# Patient Record
Sex: MALE | Race: WHITE | Employment: FULL TIME | URBAN - METROPOLITAN AREA
[De-identification: names, ages, dates, MRNs, and addresses within clinical notes are randomized per-mention and may not be internally consistent; named-entity substitution may affect disease eponyms.]

---

## 2019-11-22 ENCOUNTER — APPOINTMENT (OUTPATIENT)
Dept: GENERAL RADIOLOGY | Age: 69
DRG: 281 | End: 2019-11-22
Attending: EMERGENCY MEDICINE
Payer: COMMERCIAL

## 2019-11-22 ENCOUNTER — HOSPITAL ENCOUNTER (INPATIENT)
Age: 69
LOS: 2 days | Discharge: HOME | DRG: 281 | End: 2019-11-24
Attending: EMERGENCY MEDICINE | Admitting: INTERNAL MEDICINE
Payer: COMMERCIAL

## 2019-11-22 DIAGNOSIS — I10 HYPERTENSION, UNSPECIFIED TYPE: ICD-10-CM

## 2019-11-22 DIAGNOSIS — I21.09 ST ELEVATION MYOCARDIAL INFARCTION (STEMI) OF ANTERIOR WALL (HCC): Primary | ICD-10-CM

## 2019-11-22 PROBLEM — I21.3 STEMI (ST ELEVATION MYOCARDIAL INFARCTION) (HCC): Status: ACTIVE | Noted: 2019-11-22

## 2019-11-22 LAB
ACT BLD: 153 SECS (ref 70–128)
ACT BLD: 213 SECS (ref 70–128)
ALBUMIN SERPL-MCNC: 3.5 G/DL (ref 3.2–4.6)
ALBUMIN/GLOB SERPL: 0.8 {RATIO} (ref 1.2–3.5)
ALP SERPL-CCNC: 167 U/L (ref 50–136)
ALT SERPL-CCNC: 36 U/L (ref 12–65)
ANION GAP SERPL CALC-SCNC: 10 MMOL/L (ref 7–16)
AST SERPL-CCNC: 37 U/L (ref 15–37)
BASOPHILS # BLD: 0.1 K/UL (ref 0–0.2)
BASOPHILS NFR BLD: 1 % (ref 0–2)
BILIRUB SERPL-MCNC: 0.4 MG/DL (ref 0.2–1.1)
BUN SERPL-MCNC: 27 MG/DL (ref 8–23)
CALCIUM SERPL-MCNC: 8.8 MG/DL (ref 8.3–10.4)
CHLORIDE SERPL-SCNC: 110 MMOL/L (ref 98–107)
CO2 SERPL-SCNC: 23 MMOL/L (ref 21–32)
CREAT SERPL-MCNC: 0.9 MG/DL (ref 0.8–1.5)
DIFFERENTIAL METHOD BLD: ABNORMAL
EOSINOPHIL # BLD: 0.3 K/UL (ref 0–0.8)
EOSINOPHIL NFR BLD: 3 % (ref 0.5–7.8)
ERYTHROCYTE [DISTWIDTH] IN BLOOD BY AUTOMATED COUNT: 13.1 % (ref 11.9–14.6)
GLOBULIN SER CALC-MCNC: 4.3 G/DL (ref 2.3–3.5)
GLUCOSE SERPL-MCNC: 102 MG/DL (ref 65–100)
HCT VFR BLD AUTO: 43.7 % (ref 41.1–50.3)
HGB BLD-MCNC: 14.6 G/DL (ref 13.6–17.2)
IMM GRANULOCYTES # BLD AUTO: 0.1 K/UL (ref 0–0.5)
IMM GRANULOCYTES NFR BLD AUTO: 1 % (ref 0–5)
LYMPHOCYTES # BLD: 1.1 K/UL (ref 0.5–4.6)
LYMPHOCYTES NFR BLD: 12 % (ref 13–44)
MCH RBC QN AUTO: 32 PG (ref 26.1–32.9)
MCHC RBC AUTO-ENTMCNC: 33.4 G/DL (ref 31.4–35)
MCV RBC AUTO: 95.8 FL (ref 79.6–97.8)
MONOCYTES # BLD: 0.9 K/UL (ref 0.1–1.3)
MONOCYTES NFR BLD: 10 % (ref 4–12)
NEUTS SEG # BLD: 6.8 K/UL (ref 1.7–8.2)
NEUTS SEG NFR BLD: 74 % (ref 43–78)
NRBC # BLD: 0 K/UL (ref 0–0.2)
PLATELET # BLD AUTO: 233 K/UL (ref 150–450)
PMV BLD AUTO: 10.2 FL (ref 9.4–12.3)
POTASSIUM SERPL-SCNC: 3.9 MMOL/L (ref 3.5–5.1)
PROT SERPL-MCNC: 7.8 G/DL (ref 6.3–8.2)
RBC # BLD AUTO: 4.56 M/UL (ref 4.23–5.6)
SODIUM SERPL-SCNC: 143 MMOL/L (ref 136–145)
TROPONIN I BLD-MCNC: 0.04 NG/ML (ref 0.02–0.05)
TROPONIN I SERPL-MCNC: <0.02 NG/ML (ref 0.02–0.05)
WBC # BLD AUTO: 9.3 K/UL (ref 4.3–11.1)

## 2019-11-22 PROCEDURE — 77030004559 HC CATH ANGI DX SUPT CARD -B

## 2019-11-22 PROCEDURE — 85025 COMPLETE CBC W/AUTO DIFF WBC: CPT

## 2019-11-22 PROCEDURE — 74011250636 HC RX REV CODE- 250/636: Performed by: NURSE PRACTITIONER

## 2019-11-22 PROCEDURE — 99285 EMERGENCY DEPT VISIT HI MDM: CPT | Performed by: EMERGENCY MEDICINE

## 2019-11-22 PROCEDURE — 65620000000 HC RM CCU GENERAL

## 2019-11-22 PROCEDURE — B2111ZZ FLUOROSCOPY OF MULTIPLE CORONARY ARTERIES USING LOW OSMOLAR CONTRAST: ICD-10-PCS | Performed by: INTERNAL MEDICINE

## 2019-11-22 PROCEDURE — 74011250637 HC RX REV CODE- 250/637: Performed by: INTERNAL MEDICINE

## 2019-11-22 PROCEDURE — 77030012468 HC VLV BLEEDBK CNTRL ABBT -B

## 2019-11-22 PROCEDURE — 99152 MOD SED SAME PHYS/QHP 5/>YRS: CPT

## 2019-11-22 PROCEDURE — C1894 INTRO/SHEATH, NON-LASER: HCPCS

## 2019-11-22 PROCEDURE — 74011250636 HC RX REV CODE- 250/636: Performed by: INTERNAL MEDICINE

## 2019-11-22 PROCEDURE — C1769 GUIDE WIRE: HCPCS

## 2019-11-22 PROCEDURE — 84484 ASSAY OF TROPONIN QUANT: CPT

## 2019-11-22 PROCEDURE — 93005 ELECTROCARDIOGRAM TRACING: CPT | Performed by: EMERGENCY MEDICINE

## 2019-11-22 PROCEDURE — 80053 COMPREHEN METABOLIC PANEL: CPT

## 2019-11-22 PROCEDURE — B2151ZZ FLUOROSCOPY OF LEFT HEART USING LOW OSMOLAR CONTRAST: ICD-10-PCS | Performed by: INTERNAL MEDICINE

## 2019-11-22 PROCEDURE — 74011250637 HC RX REV CODE- 250/637: Performed by: EMERGENCY MEDICINE

## 2019-11-22 PROCEDURE — 77030029997 HC DEV COM RDL R BND TELE -B

## 2019-11-22 PROCEDURE — 99153 MOD SED SAME PHYS/QHP EA: CPT

## 2019-11-22 PROCEDURE — 96374 THER/PROPH/DIAG INJ IV PUSH: CPT | Performed by: EMERGENCY MEDICINE

## 2019-11-22 PROCEDURE — C1887 CATHETER, GUIDING: HCPCS

## 2019-11-22 PROCEDURE — C1760 CLOSURE DEV, VASC: HCPCS

## 2019-11-22 PROCEDURE — 85347 COAGULATION TIME ACTIVATED: CPT

## 2019-11-22 PROCEDURE — 96375 TX/PRO/DX INJ NEW DRUG ADDON: CPT | Performed by: EMERGENCY MEDICINE

## 2019-11-22 PROCEDURE — 74011250636 HC RX REV CODE- 250/636: Performed by: EMERGENCY MEDICINE

## 2019-11-22 PROCEDURE — 93458 L HRT ARTERY/VENTRICLE ANGIO: CPT

## 2019-11-22 PROCEDURE — 74011636320 HC RX REV CODE- 636/320: Performed by: INTERNAL MEDICINE

## 2019-11-22 PROCEDURE — 77030034850

## 2019-11-22 PROCEDURE — 74011000250 HC RX REV CODE- 250: Performed by: NURSE PRACTITIONER

## 2019-11-22 PROCEDURE — 74011250636 HC RX REV CODE- 250/636

## 2019-11-22 PROCEDURE — 77030004534 HC CATH ANGI DX INFN CARD -A

## 2019-11-22 PROCEDURE — 71045 X-RAY EXAM CHEST 1 VIEW: CPT

## 2019-11-22 PROCEDURE — 4A023N7 MEASUREMENT OF CARDIAC SAMPLING AND PRESSURE, LEFT HEART, PERCUTANEOUS APPROACH: ICD-10-PCS | Performed by: INTERNAL MEDICINE

## 2019-11-22 PROCEDURE — 74011250637 HC RX REV CODE- 250/637: Performed by: NURSE PRACTITIONER

## 2019-11-22 RX ORDER — SODIUM CHLORIDE 0.9 % (FLUSH) 0.9 %
5-40 SYRINGE (ML) INJECTION EVERY 8 HOURS
Status: DISCONTINUED | OUTPATIENT
Start: 2019-11-22 | End: 2019-11-24 | Stop reason: HOSPADM

## 2019-11-22 RX ORDER — ONDANSETRON 2 MG/ML
4 INJECTION INTRAMUSCULAR; INTRAVENOUS
Status: DISCONTINUED | OUTPATIENT
Start: 2019-11-22 | End: 2019-11-24 | Stop reason: HOSPADM

## 2019-11-22 RX ORDER — ATORVASTATIN CALCIUM 40 MG/1
80 TABLET, FILM COATED ORAL DAILY
Status: DISCONTINUED | OUTPATIENT
Start: 2019-11-22 | End: 2019-11-24 | Stop reason: HOSPADM

## 2019-11-22 RX ORDER — LOSARTAN POTASSIUM 25 MG/1
25 TABLET ORAL DAILY
Status: DISCONTINUED | OUTPATIENT
Start: 2019-11-23 | End: 2019-11-24 | Stop reason: HOSPADM

## 2019-11-22 RX ORDER — METOPROLOL TARTRATE 25 MG/1
25 TABLET, FILM COATED ORAL 2 TIMES DAILY
Status: DISCONTINUED | OUTPATIENT
Start: 2019-11-23 | End: 2019-11-24 | Stop reason: HOSPADM

## 2019-11-22 RX ORDER — SODIUM CHLORIDE 9 MG/ML
75 INJECTION, SOLUTION INTRAVENOUS CONTINUOUS
Status: DISPENSED | OUTPATIENT
Start: 2019-11-22 | End: 2019-11-23

## 2019-11-22 RX ORDER — SODIUM CHLORIDE 0.9 % (FLUSH) 0.9 %
5-40 SYRINGE (ML) INJECTION AS NEEDED
Status: DISCONTINUED | OUTPATIENT
Start: 2019-11-22 | End: 2019-11-22

## 2019-11-22 RX ORDER — HEPARIN SODIUM 200 [USP'U]/100ML
2 INJECTION, SOLUTION INTRAVENOUS CONTINUOUS
Status: DISCONTINUED | OUTPATIENT
Start: 2019-11-22 | End: 2019-11-22

## 2019-11-22 RX ORDER — SODIUM CHLORIDE 0.9 % (FLUSH) 0.9 %
5-40 SYRINGE (ML) INJECTION AS NEEDED
Status: DISCONTINUED | OUTPATIENT
Start: 2019-11-22 | End: 2019-11-24 | Stop reason: HOSPADM

## 2019-11-22 RX ORDER — METOPROLOL TARTRATE 5 MG/5ML
2.5 INJECTION INTRAVENOUS
Status: DISCONTINUED | OUTPATIENT
Start: 2019-11-22 | End: 2019-11-22

## 2019-11-22 RX ORDER — CLOPIDOGREL BISULFATE 75 MG/1
600 TABLET ORAL ONCE
Status: COMPLETED | OUTPATIENT
Start: 2019-11-22 | End: 2019-11-22

## 2019-11-22 RX ORDER — CLOPIDOGREL BISULFATE 75 MG/1
75 TABLET ORAL DAILY
Status: DISCONTINUED | OUTPATIENT
Start: 2019-11-23 | End: 2019-11-24 | Stop reason: HOSPADM

## 2019-11-22 RX ORDER — METOPROLOL TARTRATE 5 MG/5ML
2.5 INJECTION INTRAVENOUS ONCE
Status: COMPLETED | OUTPATIENT
Start: 2019-11-22 | End: 2019-11-22

## 2019-11-22 RX ORDER — LORAZEPAM 2 MG/ML
0.5 INJECTION INTRAMUSCULAR
Status: DISCONTINUED | OUTPATIENT
Start: 2019-11-22 | End: 2019-11-24 | Stop reason: HOSPADM

## 2019-11-22 RX ORDER — GUAIFENESIN 100 MG/5ML
324 LIQUID (ML) ORAL
Status: COMPLETED | OUTPATIENT
Start: 2019-11-22 | End: 2019-11-22

## 2019-11-22 RX ORDER — MIDAZOLAM HYDROCHLORIDE 1 MG/ML
.5-2 INJECTION, SOLUTION INTRAMUSCULAR; INTRAVENOUS
Status: DISCONTINUED | OUTPATIENT
Start: 2019-11-22 | End: 2019-11-22

## 2019-11-22 RX ORDER — HEPARIN SODIUM 5000 [USP'U]/ML
5000 INJECTION, SOLUTION INTRAVENOUS; SUBCUTANEOUS
Status: COMPLETED | OUTPATIENT
Start: 2019-11-22 | End: 2019-11-22

## 2019-11-22 RX ORDER — FAMOTIDINE 10 MG/ML
INJECTION INTRAVENOUS
Status: COMPLETED
Start: 2019-11-22 | End: 2019-11-22

## 2019-11-22 RX ORDER — LOSARTAN POTASSIUM 25 MG/1
25 TABLET ORAL DAILY
COMMUNITY

## 2019-11-22 RX ORDER — FAMOTIDINE 20 MG/1
20 TABLET, FILM COATED ORAL 2 TIMES DAILY
Status: DISCONTINUED | OUTPATIENT
Start: 2019-11-23 | End: 2019-11-24 | Stop reason: HOSPADM

## 2019-11-22 RX ORDER — SODIUM CHLORIDE 0.9 % (FLUSH) 0.9 %
5-40 SYRINGE (ML) INJECTION EVERY 8 HOURS
Status: DISCONTINUED | OUTPATIENT
Start: 2019-11-22 | End: 2019-11-22

## 2019-11-22 RX ORDER — DIPHENHYDRAMINE HCL 25 MG
25 CAPSULE ORAL
Status: DISCONTINUED | OUTPATIENT
Start: 2019-11-22 | End: 2019-11-24 | Stop reason: HOSPADM

## 2019-11-22 RX ORDER — MAG HYDROX/ALUMINUM HYD/SIMETH 200-200-20
30 SUSPENSION, ORAL (FINAL DOSE FORM) ORAL
Status: DISCONTINUED | OUTPATIENT
Start: 2019-11-22 | End: 2019-11-24 | Stop reason: HOSPADM

## 2019-11-22 RX ORDER — CALCIUM CARBONATE 200(500)MG
200 TABLET,CHEWABLE ORAL
Status: DISCONTINUED | OUTPATIENT
Start: 2019-11-22 | End: 2019-11-24 | Stop reason: HOSPADM

## 2019-11-22 RX ORDER — MORPHINE SULFATE 2 MG/ML
2 INJECTION, SOLUTION INTRAMUSCULAR; INTRAVENOUS
Status: DISCONTINUED | OUTPATIENT
Start: 2019-11-22 | End: 2019-11-24 | Stop reason: HOSPADM

## 2019-11-22 RX ORDER — ACETAMINOPHEN 325 MG/1
650 TABLET ORAL
Status: DISCONTINUED | OUTPATIENT
Start: 2019-11-22 | End: 2019-11-24 | Stop reason: HOSPADM

## 2019-11-22 RX ORDER — NITROGLYCERIN 0.4 MG/1
0.4 TABLET SUBLINGUAL
Status: DISCONTINUED | OUTPATIENT
Start: 2019-11-22 | End: 2019-11-24 | Stop reason: HOSPADM

## 2019-11-22 RX ORDER — GUAIFENESIN 100 MG/5ML
81 LIQUID (ML) ORAL DAILY
Status: DISCONTINUED | OUTPATIENT
Start: 2019-11-23 | End: 2019-11-24 | Stop reason: HOSPADM

## 2019-11-22 RX ORDER — FENTANYL CITRATE 50 UG/ML
25-50 INJECTION, SOLUTION INTRAMUSCULAR; INTRAVENOUS
Status: DISCONTINUED | OUTPATIENT
Start: 2019-11-22 | End: 2019-11-22

## 2019-11-22 RX ORDER — HYDROCODONE BITARTRATE AND ACETAMINOPHEN 5; 325 MG/1; MG/1
1 TABLET ORAL
Status: DISCONTINUED | OUTPATIENT
Start: 2019-11-22 | End: 2019-11-24 | Stop reason: HOSPADM

## 2019-11-22 RX ORDER — ONDANSETRON 2 MG/ML
4 INJECTION INTRAMUSCULAR; INTRAVENOUS
Status: COMPLETED | OUTPATIENT
Start: 2019-11-22 | End: 2019-11-22

## 2019-11-22 RX ORDER — HEPARIN SODIUM 10000 [USP'U]/ML
1000-10000 INJECTION, SOLUTION INTRAVENOUS; SUBCUTANEOUS
Status: DISCONTINUED | OUTPATIENT
Start: 2019-11-22 | End: 2019-11-22

## 2019-11-22 RX ADMIN — CLOPIDOGREL BISULFATE 600 MG: 75 TABLET ORAL at 22:01

## 2019-11-22 RX ADMIN — NITROGLYCERIN 0.4 MG: 0.4 TABLET SUBLINGUAL at 20:30

## 2019-11-22 RX ADMIN — Medication 5 ML: at 22:14

## 2019-11-22 RX ADMIN — MIDAZOLAM 1 MG: 1 INJECTION INTRAMUSCULAR; INTRAVENOUS at 21:01

## 2019-11-22 RX ADMIN — METOPROLOL TARTRATE 2.5 MG: 5 INJECTION INTRAVENOUS at 20:36

## 2019-11-22 RX ADMIN — SODIUM CHLORIDE 75 ML/HR: 900 INJECTION, SOLUTION INTRAVENOUS at 22:53

## 2019-11-22 RX ADMIN — ATORVASTATIN CALCIUM 80 MG: 40 TABLET, FILM COATED ORAL at 22:53

## 2019-11-22 RX ADMIN — HEPARIN SODIUM 2 UNITS/HR: 5000 INJECTION, SOLUTION INTRAVENOUS; SUBCUTANEOUS at 21:06

## 2019-11-22 RX ADMIN — HEPARIN SODIUM 2000 UNITS: 10000 INJECTION INTRAVENOUS; SUBCUTANEOUS at 21:43

## 2019-11-22 RX ADMIN — MIDAZOLAM 1 MG: 1 INJECTION INTRAMUSCULAR; INTRAVENOUS at 21:05

## 2019-11-22 RX ADMIN — HEPARIN SODIUM 5000 UNITS: 5000 INJECTION INTRAVENOUS; SUBCUTANEOUS at 20:34

## 2019-11-22 RX ADMIN — MIDAZOLAM 2 MG: 1 INJECTION INTRAMUSCULAR; INTRAVENOUS at 21:11

## 2019-11-22 RX ADMIN — HEPARIN SODIUM 5200 UNITS: 10000 INJECTION INTRAVENOUS; SUBCUTANEOUS at 21:26

## 2019-11-22 RX ADMIN — FAMOTIDINE 20 MG: 10 INJECTION, SOLUTION INTRAVENOUS at 20:41

## 2019-11-22 RX ADMIN — NITROGLYCERIN 0.4 MG: 0.4 TABLET SUBLINGUAL at 20:25

## 2019-11-22 RX ADMIN — FENTANYL CITRATE 25 MCG: 50 INJECTION, SOLUTION INTRAMUSCULAR; INTRAVENOUS at 21:43

## 2019-11-22 RX ADMIN — ASPIRIN 81 MG 324 MG: 81 TABLET ORAL at 20:31

## 2019-11-22 RX ADMIN — ONDANSETRON 4 MG: 2 INJECTION INTRAMUSCULAR; INTRAVENOUS at 20:43

## 2019-11-22 RX ADMIN — FENTANYL CITRATE 25 MCG: 50 INJECTION, SOLUTION INTRAMUSCULAR; INTRAVENOUS at 21:19

## 2019-11-22 RX ADMIN — MIDAZOLAM 1 MG: 1 INJECTION INTRAMUSCULAR; INTRAVENOUS at 21:19

## 2019-11-22 RX ADMIN — METOPROLOL TARTRATE 2.5 MG: 5 INJECTION INTRAVENOUS at 20:34

## 2019-11-22 RX ADMIN — FENTANYL CITRATE 25 MCG: 50 INJECTION, SOLUTION INTRAMUSCULAR; INTRAVENOUS at 21:01

## 2019-11-22 RX ADMIN — FENTANYL CITRATE 25 MCG: 50 INJECTION, SOLUTION INTRAMUSCULAR; INTRAVENOUS at 21:05

## 2019-11-22 RX ADMIN — IOPAMIDOL 240 ML: 755 INJECTION, SOLUTION INTRAVENOUS at 22:08

## 2019-11-22 RX ADMIN — Medication 5 ML: at 23:00

## 2019-11-22 RX ADMIN — MIDAZOLAM 1 MG: 1 INJECTION INTRAMUSCULAR; INTRAVENOUS at 21:43

## 2019-11-23 LAB
ANION GAP SERPL CALC-SCNC: 6 MMOL/L (ref 7–16)
ATRIAL RATE: 86 BPM
ATRIAL RATE: 91 BPM
BUN SERPL-MCNC: 20 MG/DL (ref 8–23)
CALCIUM SERPL-MCNC: 7.9 MG/DL (ref 8.3–10.4)
CALCULATED P AXIS, ECG09: -88 DEGREES
CALCULATED P AXIS, ECG09: 71 DEGREES
CALCULATED R AXIS, ECG10: 21 DEGREES
CALCULATED R AXIS, ECG10: 35 DEGREES
CALCULATED T AXIS, ECG11: 69 DEGREES
CALCULATED T AXIS, ECG11: 72 DEGREES
CHLORIDE SERPL-SCNC: 108 MMOL/L (ref 98–107)
CHOLEST SERPL-MCNC: 141 MG/DL
CO2 SERPL-SCNC: 26 MMOL/L (ref 21–32)
CREAT SERPL-MCNC: 0.68 MG/DL (ref 0.8–1.5)
DIAGNOSIS, 93000: NORMAL
DIAGNOSIS, 93000: NORMAL
ERYTHROCYTE [DISTWIDTH] IN BLOOD BY AUTOMATED COUNT: 12.9 % (ref 11.9–14.6)
EST. AVERAGE GLUCOSE BLD GHB EST-MCNC: 108 MG/DL
GLUCOSE SERPL-MCNC: 99 MG/DL (ref 65–100)
HBA1C MFR BLD: 5.4 % (ref 4.8–6)
HCT VFR BLD AUTO: 39 % (ref 41.1–50.3)
HDLC SERPL-MCNC: 58 MG/DL (ref 40–60)
HDLC SERPL: 2.4 {RATIO}
HGB BLD-MCNC: 12.9 G/DL (ref 13.6–17.2)
LDLC SERPL CALC-MCNC: 69 MG/DL
LIPID PROFILE,FLP: NORMAL
MAGNESIUM SERPL-MCNC: 2 MG/DL (ref 1.8–2.4)
MCH RBC QN AUTO: 32 PG (ref 26.1–32.9)
MCHC RBC AUTO-ENTMCNC: 33.1 G/DL (ref 31.4–35)
MCV RBC AUTO: 96.8 FL (ref 79.6–97.8)
NRBC # BLD: 0 K/UL (ref 0–0.2)
P-R INTERVAL, ECG05: 142 MS
P-R INTERVAL, ECG05: 174 MS
PLATELET # BLD AUTO: 190 K/UL (ref 150–450)
PMV BLD AUTO: 10.4 FL (ref 9.4–12.3)
POTASSIUM SERPL-SCNC: 3.7 MMOL/L (ref 3.5–5.1)
Q-T INTERVAL, ECG07: 372 MS
Q-T INTERVAL, ECG07: 378 MS
QRS DURATION, ECG06: 110 MS
QRS DURATION, ECG06: 88 MS
QTC CALCULATION (BEZET), ECG08: 445 MS
QTC CALCULATION (BEZET), ECG08: 464 MS
RBC # BLD AUTO: 4.03 M/UL (ref 4.23–5.6)
SODIUM SERPL-SCNC: 140 MMOL/L (ref 136–145)
TRIGL SERPL-MCNC: 70 MG/DL (ref 35–150)
TROPONIN I SERPL-MCNC: 8.65 NG/ML (ref 0.02–0.05)
TSH SERPL DL<=0.005 MIU/L-ACNC: 1.79 UIU/ML (ref 0.36–3.74)
VENTRICULAR RATE, ECG03: 86 BPM
VENTRICULAR RATE, ECG03: 91 BPM
VLDLC SERPL CALC-MCNC: 14 MG/DL (ref 6–23)
WBC # BLD AUTO: 7.4 K/UL (ref 4.3–11.1)

## 2019-11-23 PROCEDURE — 83735 ASSAY OF MAGNESIUM: CPT

## 2019-11-23 PROCEDURE — 74011250636 HC RX REV CODE- 250/636: Performed by: INTERNAL MEDICINE

## 2019-11-23 PROCEDURE — 36415 COLL VENOUS BLD VENIPUNCTURE: CPT

## 2019-11-23 PROCEDURE — 74011000250 HC RX REV CODE- 250: Performed by: INTERNAL MEDICINE

## 2019-11-23 PROCEDURE — 85027 COMPLETE CBC AUTOMATED: CPT

## 2019-11-23 PROCEDURE — 80048 BASIC METABOLIC PNL TOTAL CA: CPT

## 2019-11-23 PROCEDURE — 84443 ASSAY THYROID STIM HORMONE: CPT

## 2019-11-23 PROCEDURE — 65660000000 HC RM CCU STEPDOWN

## 2019-11-23 PROCEDURE — 74011250637 HC RX REV CODE- 250/637: Performed by: INTERNAL MEDICINE

## 2019-11-23 PROCEDURE — 83036 HEMOGLOBIN GLYCOSYLATED A1C: CPT

## 2019-11-23 PROCEDURE — 74011250637 HC RX REV CODE- 250/637: Performed by: NURSE PRACTITIONER

## 2019-11-23 PROCEDURE — 77030010547 HC BG URIN W/UMETER -A

## 2019-11-23 PROCEDURE — 80061 LIPID PANEL: CPT

## 2019-11-23 PROCEDURE — 84484 ASSAY OF TROPONIN QUANT: CPT

## 2019-11-23 PROCEDURE — C8929 TTE W OR WO FOL WCON,DOPPLER: HCPCS

## 2019-11-23 RX ORDER — LIDOCAINE HYDROCHLORIDE 20 MG/ML
JELLY TOPICAL ONCE
Status: DISPENSED | OUTPATIENT
Start: 2019-11-23 | End: 2019-11-23

## 2019-11-23 RX ADMIN — FAMOTIDINE 20 MG: 20 TABLET, FILM COATED ORAL at 17:05

## 2019-11-23 RX ADMIN — Medication 5 ML: at 14:20

## 2019-11-23 RX ADMIN — PERFLUTREN 1 ML: 6.52 INJECTION, SUSPENSION INTRAVENOUS at 10:00

## 2019-11-23 RX ADMIN — ACETAMINOPHEN 650 MG: 325 TABLET, FILM COATED ORAL at 19:46

## 2019-11-23 RX ADMIN — METOPROLOL TARTRATE 25 MG: 25 TABLET ORAL at 08:16

## 2019-11-23 RX ADMIN — ASPIRIN 81 MG 81 MG: 81 TABLET ORAL at 08:16

## 2019-11-23 RX ADMIN — Medication 5 ML: at 06:00

## 2019-11-23 RX ADMIN — LOSARTAN POTASSIUM 25 MG: 25 TABLET ORAL at 08:16

## 2019-11-23 RX ADMIN — ATORVASTATIN CALCIUM 80 MG: 40 TABLET, FILM COATED ORAL at 21:17

## 2019-11-23 RX ADMIN — FAMOTIDINE 20 MG: 20 TABLET, FILM COATED ORAL at 08:16

## 2019-11-23 RX ADMIN — DIPHENHYDRAMINE HYDROCHLORIDE 25 MG: 25 CAPSULE ORAL at 21:17

## 2019-11-23 RX ADMIN — POTASSIUM BICARBONATE 40 MEQ: 782 TABLET, EFFERVESCENT ORAL at 04:52

## 2019-11-23 RX ADMIN — METOPROLOL TARTRATE 25 MG: 25 TABLET ORAL at 17:05

## 2019-11-23 RX ADMIN — Medication 5 ML: at 21:17

## 2019-11-23 RX ADMIN — CLOPIDOGREL BISULFATE 75 MG: 75 TABLET ORAL at 08:16

## 2019-11-23 RX ADMIN — POTASSIUM BICARBONATE 40 MEQ: 782 TABLET, EFFERVESCENT ORAL at 08:16

## 2019-11-23 NOTE — PROGRESS NOTES
TRANSFER - IN REPORT:    Verbal report received from Dank, [address] on [place] Poster being received from CCU for routine progression of care      Report consisted of patients Situation, Background, Assessment and Recommendations(SBAR). Information from the following report(s) SBAR, Kardex and MAR was reviewed with the receiving nurse. Opportunity for questions and clarification was provided. Assessment completed upon patients arrival to unit and care assumed. Dual RN skin assessment completed reveals     11/23/19 4117   Dual Skin Pressure Injury Assessment   Dual Skin Pressure Injury Assessment WDL   Second Care Provider (Based on [address] [place] [address]) Lazaro Butler RN   Skin Integumentary   Skin Integumentary (WDL) X    Pressure  Injury Documentation No Pressure Injury Noted-Pressure Ulcer Prevention Initiated   Skin Color Appropriate for ethnicity   Skin Condition/Temp Warm;Dry   Skin Integrity Other (comment)  (R. groin and R. radial cath exit sites)   Turgor Non-tenting   Hair Growth Present   Varicosities Absent     Skin warm and dry. Patient with a right groin and right radial cath exit site. Both with dressing c/d/i and no noted hemotoma or bleeding. Skin over sacrum and other pressure points intact and no other skin issues noted.

## 2019-11-23 NOTE — PROGRESS NOTES
[facility] CARDIOLOGY PROGRESS NOTE    11/23/2019 10:46 AM    Admit Date: 11/22/2019    Admit Diagnosis: STEMI (ST elevation myocardial infarction) ([facility] [I21.3]      Subjective:   Stable overnight without angina, CHF, or palpitations. Vitals stable and controlled. No other complaints overnight. Tolerating meds well. Had 15 beats NSVT last night, asymptomatic. Objective:      Vitals:    11/23/19 0801 11/23/19 0815 11/23/19 0824 11/23/19 0836   BP: 150/75      Pulse: 61 64 (!) 58 (!) 59   Resp: 20 21 17   Temp:       SpO2: 97% 99% 97% 99%   Weight:       Height:           Physical Exam:  Neck- supple, no JVD  CV- regular rate and rhythm no MRG  Lung- clear bilaterally  Abd- soft, nontender, nondistended  Ext- no edema, right groin CDI  Skin- warm and dry    Data Review:   Recent Labs     11/23/19  0306 11/22/19 2021    143   K 3.7 3.9   MG 2.0  --    BUN 20 27*   CREA 0.68* 0.90   GLU 99 102*   WBC 7.4 9.3   HGB 12.9* 14.6   HCT 39.0* 43.7    233   CHOL 141  --    TRIGL 70  --    HDL 58  --        Assessment and Plan: Active Problems:    STEMI (ST elevation myocardial infarction) ([facility] (11/22/2019)- improved, resolved, no angina or CHF. Echo today. HTN (hypertension) (11/22/2019)- controlled, continue meds, titrate as tolerated    NSVT- asymptomatic, labs stable, replete K today, follow tele. To floor today  Echo today  BMP, CBC, MG IN AM  TELE   ? HOME TOMORROW.          Wil Rendon MD  [facility] Cardiology  Pager [phone]

## 2019-11-23 NOTE — PROCEDURES
[facility]  CARDIAC CATH    Name:  Lupe Rodriguez  MR#:  [id number]  :  1950  ACCOUNT #:  [de-identified]  DATE OF SERVICE:  2019      PREOPERATIVE DIAGNOSIS:  Acute inferior myocardial infarction. POSTOPERATIVE DIAGNOSIS:  Acute inferior myocardial infarction. PROCEDURES PERFORMED:  Left heart cath with coronary angiography and left ventriculogram, dottering of the distal posterior descending branch with a wire but no angioplasty or stenting performed due to the distal nature of disease. SURGEON:  Steph Russell MD    ASSISTANT:  None. ANESTHESIA:  The patient was sedated by Pema Sarabia with a total of 5 mg Versed and 100 mcg fentanyl and monitored from 08:58 p.m. to 09:43 p.m. TOTAL CONTRAST:  240 mL of Isovue. COMPLICATIONS:  None. SPECIMENS REMOVED:  None. ESTIMATED BLOOD LOSS:  Less than 10 mL. IMPLANTS:  None. PROCEDURE TECHNIQUE:  After informed consent was obtained, the patient was brought to the cath lab, prepped and draped in the usual fashion. Access was obtained in the right radial artery on four separate occasions in different locations but a micropuncture wire could not be advanced up the radial artery. We aborted and placed a TR band and quickly obtained access in the right femoral artery with a bounding pulse, however, the iliac turned out to be extremely tortuous making navigation somewhat difficult. We eventually placed a wire into the descending aorta and placed a 6-Romansh sheath, but due to the tortuosity of the iliac and distal aorta, there was poor backup support which made it impossible to engage the coronaries with standard catheters. We placed a long Terumo sheath which was again ineffective. We eventually placed a 65 cm Destination sheath which greatly enhanced deliverance of the catheters and angiography was performed.   We used an XB3.5 to image the left main and a JR-4 guiding catheter to image the right coronary. Heparin was used for anticoagulation during the procedure and to dotter the distal-most portion of the posterior descending branch which was acutely occluded. At the conclusion of the procedure, an Angio-Seal was deployed at the right arterial access site due to a small hematoma and a leak around the sheath by imaging. There was excellent hemostasis and no hematoma or ecchymosis was noted at the conclusion of the procedure. The patient was asymptomatic with flat ST segments and no angina. He will be transported to the CCU in stable condition. PRESSURE RESULTS:  Left ventricle 120/15, aorta 120/60. The left ventriculogram reveals minimal hypokinesis of the inferior apex. Ejection fraction is greater than 60% with normal regional wall motion elsewhere. End-diastolic pressure is mildly elevated. There is no mitral regurgitation and no aortic valve gradient on catheter pullback. CORONARY ANATOMY:  The left main has mild irregularity up to 20% in the mid vessel dividing into an LAD and circumflex in the usual fashion. The LAD gives off a large bifurcating high first diagonal.  The entire LAD and diagonal distribution have mild luminal irregularity. The circumflex is a bifurcating system which has mild luminal irregularity. The right coronary is a very large anatomically dominant vessel which divides into a large posterior descending branch and large posterolateral branch. The right coronary proper and the posterolateral branch have minimal irregularity. The posterior descending branch has minimal irregularity as well, but there is a subtotal 95-99% occlusion in the distal most aspect of the vessel with a faint trickle of flow and a very small terminal portion of vessel which is maybe 10-15 mm in length at most.    PERCUTANEOUS INTERVENTION REPORT:  Given the acute occlusion and symptoms, we elected to try to dilate the distal posterior descending branch.   We initially dottered with a wire but given failure of the vessel to open but establishing a trickle of flow distally demonstrating a very small caliber vessel distally, we elected to leave this to medical therapy. The patient had flat ST segments and no angina at the conclusion of the procedure. His terminal posterior descending occlusion will be managed medically. Plavix will be added and the patient will have empiric statin for at least the next few months as tolerated. The patient received aspirin and beta-blocker which we will continue. CONCLUSIONS:  1. Acute occlusion of the terminal portion of the posterior descending branch which was dottered by wire but left to medical therapy without attempts at angioplasty or aspiration given the distal-most nature of the disease and very small caliber vessel distally. 2.  Mild coronary irregularities otherwise. 3.  Preserved ejection fraction with minimal apical inferior hypokinesis.       Ced Valdez MD      [id number]  D:  11/22/2019 22:23  T:  11/23/2019 18:36  JOB #:  [id number]  CC:  [facility] Cardiology

## 2019-11-23 NOTE — PROGRESS NOTES
Bedside and Verbal shift change report given to DESHAUN Weems and Love Weinstein RN (oncoming nurse) by Dee Dee Garcia RN (offgoing nurse). Report included the following information SBAR, Kardex, ED Summary, Procedure Summary, Intake/Output, MAR, Cardiac Rhythm NSR and Alarm Parameters . R radial site c/d/i with gauze and tegaderm. R groin site c/d/i with gauze and tegaderm. No signs of bleeding or hematoma.

## 2019-11-23 NOTE — PROGRESS NOTES
Date of Outreach Update:  Peg Tillman was seen sleeping in bed, even unlabored respirations. Spoke with primary RN, Awilda Hernandez, no needs voiced at this time. MEWS Score: 1 (11/23/19 1423)  Vitals:    11/23/19 1231 11/23/19 1301 11/23/19 1330 11/23/19 1423   BP: 139/71 141/75 137/72 124/74   Pulse: 69 64 67 66   Resp:   24 20   Temp:    98.3 °F (36.8 °C)   SpO2: 97% 98% 98% 96%   Weight:       Height:             Pain Assessment  Pain Intensity 1: 0 (11/23/19 1415)  Pain Location 1: Chest     Patient Stated Pain Goal: 0      Previous Outreach assessment has been reviewed. There have been no significant clinical changes since the completion of the last dated Outreach assessment. Will continue to follow up per outreach protocol.     Signed By:   Gustavo Ramirez RN    November 23, 2019 3:53 PM

## 2019-11-23 NOTE — H&P
[facility] CARDIOLOGY   History &Physical                 Primary Cardiologist: None    Primary Care Physician: MD in [facility]    Admitting Physician: Dr. Yuli Marcial:     Patient is a 71 y.o.  male with PMHx of HTN who presented to the ED with c/o chest pressure. He is here from [facility] visiting family. Tonight he was was playing with his grandchildren. States that he noted a pressure start in his chest about 1950. Pressure was non-radiating and non-relenting. He denies associated SOB/HERNÁNDEZ, dizziness, palpitations, syncope, N/V/D, diaphoresis or edema. He presented to the ED for evaluation. In the ED: initial EKG with ST elevation. STEMI protocol was activated. He was given 324mg ASA and 2 SL NTG with reduction in CP from 7/10 -5/10 and development of HA. Cardiology was called to bedside. On arrival Pt resting on stretcher in NAD but with persistent SS chest pressure. BP was severely elevated at 172/105 on arrival and was down to 140/100 after NTG. HR 90s. Given 5000 units IV heparin, 5mg IV Lopressor, 4mg Zofran and 20mg Pepcid (Pt had eaten pizza at approx 1830). Pts CP and EKG with some improvement after heparin and BB. Dr. Dontrell Scott to bedside and plan to proceed to CCL. Pt denies prior known CAD. No MI or LHC. No Cardiologist. Has PCP and follows routinely. Last labs \"good. \" Had stress ECHO 5-6 yrs ago that was \"normal\" per Pt. No hx DM or HLD. No known family hx of CAD/MI. Former smoker - quit 10 yrs ago, occasional ETOH cocktail. HTN has been stable on ARB without recent med changes. Allergic to PCN as a child (unknown rxn). No past medical history on file. No past surgical history on file. Allergies   Allergen Reactions    Penicillins Anaphylaxis     Social History     Tobacco Use    Smoking status: Not on file   Substance Use Topics    Alcohol use: Not on file      FH: No family history on file.      Review of Systems  General: no weight change, no weakness, fever or chills  Skin: no rashes, lumps, or other skin changes  HEENT: no headache, dizziness, lightheadedness, vision changes, hearing changes, tinnitus, vertigo, sinus pressure/pain, bleeding gums, sore throat, or hoarseness  Neck: no swollen glands, goiter, pain or stiffness  Respiratory: no cough, sputum, hemoptysis, no dyspnea, no wheezing  Cardiovascular: + as per HPI  Gastrointestinal: no reflux, constipation, diarrhea, liver problems, GI bleeding  Urinary: no frequency, urgency , hematuria, burning/pain with urination, recent flank pain, polyuria, nocturia, or difficulty urinating  Peripheral Vascular: no claudication, leg cramps, prior DVTs, swelling of calves, legs, or feet, color change, or swelling with redness or tenderness  Musculoskeletal: no muscle or joint pain/stiffness, joint swelling, erythema of joints, or back pain  Psychiatric: no depression or excessive stress  Neurological: no sensory or motor loss, seizures, syncope, tremors, numbness, tingling, no changes in mood, attention, or speech, no changes in orientation, memory, insight, or judgment. Hematologic: no anemia, easy bruising or bleeding  Endocrine: no thyroid problems, no heat or cold intolerance, excessive sweating, polyuria, polydipsia, no diabetes. Objective:       Visit Vitals  /77   Pulse 76   Temp 98.2 °F (36.8 °C)   Resp (!) 31   Ht 6' 3\" (1.905 m)   Wt 87.5 kg (193 lb)   SpO2 93%   BMI 24.12 kg/m²       No intake/output data recorded. No intake/output data recorded.     Physical Exam:  General: Well Developed, Well Nourished, No Acute Distress  HEENT: pupils equal and round, no abnormalities noted, sclera clear  Neck: supple, no JVD, no carotid bruits  Heart: S1S2 with RRR without murmurs or gallops  Lungs: clear throughout auscultation bilaterally without adventitious sounds, normal effort, on RA  Abd: soft, nontender, nondistended, with good bowel sounds  Ext: warm, no edema, calves supple/nontender, pulses 2+ bilaterally  Skin: warm and dry  Psychiatric: Normal mood and affect, appropriate and cooperative  Neurologic: Alert and oriented X 3, CNs intact, moves all 4      ECG: ST elevation inferior leads     ECHO: ordered and pending    Data Review:      Recent Results (from the past 24 hour(s))   EKG, 12 LEAD, SUBSEQUENT    Collection Time: 11/22/19  8:11 PM   Result Value Ref Range    Ventricular Rate 86 BPM    Atrial Rate 86 BPM    P-R Interval 174 ms    QRS Duration 88 ms    Q-T Interval 372 ms    QTC Calculation (Bezet) 445 ms    Calculated P Axis -88 degrees    Calculated R Axis 35 degrees    Calculated T Axis 72 degrees    Diagnosis       Unusual P axis, possible ectopic atrial rhythm  Anterior infarct (cited on or before 22-NOV-2019)  Inferior injury pattern  ** ** ACUTE MI / STEMI ** **  Consider right ventricular involvement in acute inferior infarct  Abnormal ECG  When compared with ECG of 22-NOV-2019 20:10,  Serial changes of evolving Anterior infarct Present     CBC WITH AUTOMATED DIFF    Collection Time: 11/22/19  8:21 PM   Result Value Ref Range    WBC 9.3 4.3 - 11.1 K/uL    RBC 4.56 4.23 - 5.6 M/uL    HGB 14.6 13.6 - 17.2 g/dL    HCT 43.7 41.1 - 50.3 %    MCV 95.8 79.6 - 97.8 FL    MCH 32.0 26.1 - 32.9 PG    MCHC 33.4 31.4 - 35.0 g/dL    RDW 13.1 11.9 - 14.6 %    PLATELET 680 772 - 539 K/uL    MPV 10.2 9.4 - 12.3 FL    ABSOLUTE NRBC 0.00 0.0 - 0.2 K/uL    DF AUTOMATED      NEUTROPHILS 74 43 - 78 %    LYMPHOCYTES 12 (L) 13 - 44 %    MONOCYTES 10 4.0 - 12.0 %    EOSINOPHILS 3 0.5 - 7.8 %    BASOPHILS 1 0.0 - 2.0 %    IMMATURE GRANULOCYTES 1 0.0 - 5.0 %    ABS. NEUTROPHILS 6.8 1.7 - 8.2 K/UL    ABS. LYMPHOCYTES 1.1 0.5 - 4.6 K/UL    ABS. MONOCYTES 0.9 0.1 - 1.3 K/UL    ABS. EOSINOPHILS 0.3 0.0 - 0.8 K/UL    ABS. BASOPHILS 0.1 0.0 - 0.2 K/UL    ABS. IMM.  GRANS. 0.1 0.0 - 0.5 K/UL   METABOLIC PANEL, COMPREHENSIVE    Collection Time: 11/22/19  8:21 PM   Result Value Ref Range    Sodium 143 136 - 145 mmol/L    Potassium 3.9 3.5 - 5.1 mmol/L    Chloride 110 (H) 98 - 107 mmol/L    CO2 23 21 - 32 mmol/L    Anion gap 10 7 - 16 mmol/L    Glucose 102 (H) 65 - 100 mg/dL    BUN 27 (H) 8 - 23 MG/DL    Creatinine 0.90 0.8 - 1.5 MG/DL    GFR est AA >60 >60 ml/min/1.73m2    GFR est non-AA >60 >60 ml/min/1.73m2    Calcium 8.8 8.3 - 10.4 MG/DL    Bilirubin, total 0.4 0.2 - 1.1 MG/DL    ALT (SGPT) 36 12 - 65 U/L    AST (SGOT) 37 15 - 37 U/L    Alk. phosphatase 167 (H) 50 - 136 U/L    Protein, total 7.8 6.3 - 8.2 g/dL    Albumin 3.5 3.2 - 4.6 g/dL    Globulin 4.3 (H) 2.3 - 3.5 g/dL    A-G Ratio 0.8 (L) 1.2 - 3.5     TROPONIN I    Collection Time: 11/22/19  8:21 PM   Result Value Ref Range    Troponin-I, Qt. <0.02 (L) 0.02 - 0.05 NG/ML   POC TROPONIN-I    Collection Time: 11/22/19  8:30 PM   Result Value Ref Range    Troponin-I (POC) 0.04 0.02 - 0.05 ng/ml   EKG, 12 LEAD, INITIAL    Collection Time: 11/22/19  8:33 PM   Result Value Ref Range    Ventricular Rate 91 BPM    Atrial Rate 91 BPM    P-R Interval 142 ms    QRS Duration 110 ms    Q-T Interval 378 ms    QTC Calculation (Bezet) 464 ms    Calculated P Axis 71 degrees    Calculated R Axis 21 degrees    Calculated T Axis 69 degrees    Diagnosis       !! AGE AND GENDER SPECIFIC ECG ANALYSIS !!   Normal sinus rhythm  Anterior infarct , age undetermined  Inferior injury pattern  !!!! !!!! ACUTE MI !!!! !!!!  Abnormal ECG  No previous ECGs available     POC ACTIVATED CLOTTING TIME    Collection Time: 11/22/19  9:16 PM   Result Value Ref Range    Activated Clotting Time (POC) 153 (H) 70 - 128 SECS   POC ACTIVATED CLOTTING TIME    Collection Time: 11/22/19  9:39 PM   Result Value Ref Range    Activated Clotting Time (POC) 213 (H) 70 - 128 SECS       CXR: no acute findings    Assessment/Plan:   Active Problems:    STEMI (ST elevation myocardial infarction) -- plan to proceed for emergent LHC and revascularization with Dr. Janna Stark, procedure explained to Pt and family by Dr. Janna Stark, R/B/A discussed, will proceed, plan for ICU admission and OMT post-procedure as clinically appropriate, check ECHO in AM      HTN (hypertension) -- cont home ARB, monitor BP and add/adjust meds as clinically indicated      EWELINA Arguello  11/22/2019  9:51 PM  ATTENDING ADDENDUM:    Patient seen and examined by me. Agree with above note by physician extender. Key findings are:  No HF or arrhythmia symptoms but persistent CP with inferior MI pattern on ECG . ASA/heparin /BB given in ER. Still with 2-3/10 CP. To cath lab now. The benefits and risks of left heart catheterization and possible percutaneous intervention were discussed with the patient. Risks including but not limited to bleeding, infection, contrast allergy reaction, acute kidney injury, MI, stroke, emergent CABG and death were discussed. The patient understands the risks of the procedure and wishes to proceed. CV- RRR without murmur  Lungs- Clear bilaterally  Abd- soft, nontender, nondistended  Ext- no edema    Plan: As above.     Kali Cui MD  [facility] Cardiology  Pager [phone]

## 2019-11-23 NOTE — CONSULTS
LEAPFROG PROTOCOL NOTE    Debbi Pérezoda  11/23/2019    The patient is currently in the critical care setting managed by Dr. Brooke Cordoba  with STEMI. The patient's chart is reviewed and the patient is discussed with the staff. Patient is currently hemodynamically stable. Patient has no needs identified for Intensivist management in the critical care setting at this time. Please notify us if can be of assistance.       Rocky Cm MD

## 2019-11-23 NOTE — PROGRESS NOTES
TRANSFER - OUT REPORT:    Verbal report given to Steve RN (name) on Javed Braga  being transferred to [facility] (unit) for routine progression of care       Report consisted of patients Situation, Background, Assessment and   Recommendations(SBAR). Information from the following report(s) SBAR, Kardex, ED Summary, Procedure Summary, Intake/Output, MAR and Cardiac Rhythm NSR;SB was reviewed with the receiving nurse. Lines:   Peripheral IV 11/22/19 Left Antecubital (Active)   Site Assessment Clean, dry, & intact 11/23/2019  7:01 AM   Phlebitis Assessment 0 11/23/2019  7:01 AM   Infiltration Assessment 0 11/23/2019  7:01 AM   Dressing Status Clean, dry, & intact 11/23/2019  7:01 AM   Dressing Type Transparent;Tape 11/23/2019  7:01 AM   Hub Color/Line Status Green;Patent; Infusing 11/23/2019  7:01 AM   Alcohol Cap Used No 11/23/2019  7:01 AM        Opportunity for questions and clarification was provided.       Patient transported with:   Monitor  Registered Nurse

## 2019-11-23 NOTE — PROCEDURES
Brief Cardiac Procedure Note    Patient: Stephanie Luis MRN: [id number]  SSN: [id number]    YOB: 1950  Age: 71 y.o. Sex: male      Date of Procedure: 11/22/2019     Pre-procedure Diagnosis: STEMI    Post-procedure Diagnosis: Cad    Reason for Procedure: Suspected CAD    Procedure: Left Heart Catheterization with Percutaneous Coronary Intervention    Brief Description of Procedure: ATTEMPTED PCI DISTAL RCA    Performed By: Jana Leary MD     Assistants: NONE    Anesthesia: Moderate Sedation    Estimated Blood Loss: Less than 10 mL      Specimens: None    Implants: None    Findings:   LV NORMAL, EDP 15, NO MR OR AV GRADIENT  LM/LAD/LCX MILD IRREGS  RCA LARGE, DOMINANT WITH MILD IRREGS  DISTAL MOST PDA WITH ACUTE THROMBOTIC OCCLUSION, DOTTERED WITH WIRE BUT DISTAL FLOW MINIMAL, OCCLUSION IN TERMANL 10-15MM OF VESSEL, TREAT MEDICALLY AS ST CHANGES RESPLVED AND CP RESOLVED    RIGTH RADIAL FAILED  RIGHT GROIN ANGIOSEAL  TRTUOUS RIGHT AND LEFT ILIACS, DESTINATION SHEATH USED    Complications: None    Recommendations: Continue medical therapy.     Signed By: Jana Leary MD     November 22, 2019

## 2019-11-23 NOTE — ED TRIAGE NOTES
Patient arrives to ED complaining of chest pressure that started 30 minutes ago while playing with grand kids. Denies getting short of breath. Patient denies n/v. Patient denies cardiac history. Patient states he continues to have chest pressure at this time. Denies taking anything to help with chest pressure prior to coming to ED.

## 2019-11-23 NOTE — ED PROVIDER NOTES
Sudden onset of midsternal non-rad chest pressure around 1945 while playing with grandchildren. Currently 6/10, denies sim pain in past or aggrav/allev factors. No sob/diaphoresis/nausea. Did not take anything for it. H/o HTN, no known cardiac disease or prior cath. I had spoken with patient's son prior to his arrival, he summarized patient's symptoms and concern for possible cardiac event. No past medical history on file. No past surgical history on file. No family history on file. Social History     Socioeconomic History    Marital status: Not on file     Spouse name: Not on file    Number of children: Not on file    Years of education: Not on file    Highest education level: Not on file   Occupational History    Not on file   Social Needs    Financial resource strain: Not on file    Food insecurity:     Worry: Not on file     Inability: Not on file    Transportation needs:     Medical: Not on file     Non-medical: Not on file   Tobacco Use    Smoking status: Not on file   Substance and Sexual Activity    Alcohol use: Not on file    Drug use: Not on file    Sexual activity: Not on file   Lifestyle    Physical activity:     Days per week: Not on file     Minutes per session: Not on file    Stress: Not on file   Relationships    Social connections:     Talks on phone: Not on file     Gets together: Not on file     Attends [employer] service: Not on file     Active member of club or organization: Not on file     Attends meetings of clubs or organizations: Not on file     Relationship status: Not on file    Intimate partner violence:     Fear of current or ex partner: Not on file     Emotionally abused: Not on file     Physically abused: Not on file     Forced sexual activity: Not on file   Other Topics Concern    Not on file   Social History Narrative    Not on file         ALLERGIES: Penicillins    Review of Systems   Constitutional: Negative for chills and fever. Gastrointestinal: Negative for nausea and vomiting. All other systems reviewed and are negative. Vitals:    11/22/19 2017   BP: (!) 172/105   Pulse: 84   Resp: 16   Temp: 98.2 °F (36.8 °C)   SpO2: 95%   Weight: 87.5 kg (193 lb)   Height: 6' 3\" (1.905 m)            Physical Exam  Vitals signs and nursing note reviewed. Constitutional:       Appearance: He is well-developed. HENT:      Head: Normocephalic and atraumatic. Eyes:      Conjunctiva/sclera: Conjunctivae normal.      Pupils: Pupils are equal, round, and reactive to light. Neck:      Musculoskeletal: Normal range of motion and neck supple. Cardiovascular:      Rate and Rhythm: Normal rate and regular rhythm. Heart sounds: Murmur present. Pulmonary:      Effort: Pulmonary effort is normal.      Breath sounds: Normal breath sounds. Abdominal:      General: Bowel sounds are normal.      Palpations: Abdomen is soft. Musculoskeletal: Normal range of motion. Skin:     General: Skin is warm and dry. Neurological:      Mental Status: He is alert and oriented to person, place, and time. MDM  Number of Diagnoses or Management Options  Hypertension, unspecified type:   ST elevation myocardial infarction (STEMI) of anterior wall [facility]): new and requires workup  Diagnosis management comments: 8:29 PM STEMI called from triage. EKG worrisome for inferior ST elevation with lateral reciprocal changes.  Pain went from 6/10 to 5/10 after NTG x1  8:36 PM pain now 4/10 after 2nd NTG    Total crit care time spent on pre-hospital care, direct patient care, obtaining additional history from family, documenting, coordinating care was 40 minutes       Amount and/or Complexity of Data Reviewed  Clinical lab tests: ordered and reviewed  Tests in the medicine section of CPT®: ordered and reviewed  Obtain history from someone other than the patient: yes  Discuss the patient with other providers: yes    Risk of Complications, Morbidity, and/or Mortality  Presenting problems: high  Diagnostic procedures: high  Management options: high    Critical Care  Total time providing critical care: 30-74 minutes    Patient Progress  Patient progress: improved         Procedures

## 2019-11-23 NOTE — PROGRESS NOTES
TRANSFER - IN REPORT:    Verbal report received from DESHAUN Roberson(name) on [employer]  being received from CCL(unit) for routine progression of care      Report consisted of patients Situation, Background, Assessment and   Recommendations(SBAR). Information from the following report(s) SBAR, Kardex, Procedure Summary, MAR and Cardiac Rhythm SR was reviewed with the receiving nurse. Opportunity for questions and clarification was provided. Assessment completed upon patients arrival to unit and care assumed.

## 2019-11-23 NOTE — PROGRESS NOTES
TRANSFER - OUT REPORT:    STEMI with Dr Erickson Rojas 6 mg  Fentanyl 100 mcg  Heparin 7,200 units  Plavix 600 mg  Medical manage   Unable to pass wire up radial artery   Site covered with tegaderm and   Went right femoral   Angioseal to the site  No bleeding or hematoma noted at site. Verbal report given to Harley(name) on Suyapa Poster  being transferred to CCU(unit) for routine progression of care       Report consisted of patients Situation, Background, Assessment and   Recommendations(SBAR). Information from the following report(s) Procedure Summary was reviewed with the receiving nurse. Lines:   Peripheral IV 11/22/19 Left Antecubital (Active)   Site Assessment Clean, dry, & intact 11/22/2019  8:50 PM   Phlebitis Assessment 0 11/22/2019  8:50 PM   Infiltration Assessment 0 11/22/2019  8:50 PM   Dressing Status Clean, dry, & intact 11/22/2019  8:50 PM   Dressing Type 4 X 4 11/22/2019  8:50 PM   Hub Color/Line Status Green 11/22/2019  8:50 PM   Alcohol Cap Used Yes 11/22/2019  8:50 PM        Opportunity for questions and clarification was provided.       Patient transported with:   Registered Nurse

## 2019-11-23 NOTE — PROGRESS NOTES
Pt admitted to 3306 from cath lab. A&O x3. Denies CP/SOB. SR 55-65 on monitor. BP WDL. Dressings present to R radial site and R groin. Radial site CDI. Groin site with old dried blood. No signs of active bleeding/hematoma. Sandbag placed on site. Knee immobilizer placed. All pulses palpated. Pt given urinal but unable to void after 20 minutes. Discussed with NP. Sandoval catheter ordered and placed with some difficulty. .drained clear yellow urine then urine became blood tinged after ~10 minutes. Will continue to monitor. Dual skin assessment completed with Karey Cordoba RN. No signs of pressure injury or breakdown noted. Son at bedside and updated.

## 2019-11-23 NOTE — PROGRESS NOTES
Pt with ongoing pain around cordoba. Deflated balloon and flushed catheter, no relief. Continues to drain bloody output.  Cordoba removed, pt able to void afterwards

## 2019-11-24 VITALS
WEIGHT: 191.7 LBS | BODY MASS INDEX: 23.83 KG/M2 | HEIGHT: 75 IN | RESPIRATION RATE: 18 BRPM | TEMPERATURE: 97.6 F | HEART RATE: 91 BPM | SYSTOLIC BLOOD PRESSURE: 130 MMHG | DIASTOLIC BLOOD PRESSURE: 74 MMHG | OXYGEN SATURATION: 96 %

## 2019-11-24 PROBLEM — I21.19 ACUTE INFERIOR MYOCARDIAL INFARCTION (HCC): Status: ACTIVE | Noted: 2019-11-24

## 2019-11-24 PROBLEM — I25.10 CAD (CORONARY ARTERY DISEASE): Status: ACTIVE | Noted: 2019-11-24

## 2019-11-24 LAB
ANION GAP SERPL CALC-SCNC: 6 MMOL/L (ref 7–16)
BUN SERPL-MCNC: 12 MG/DL (ref 8–23)
CALCIUM SERPL-MCNC: 8 MG/DL (ref 8.3–10.4)
CHLORIDE SERPL-SCNC: 108 MMOL/L (ref 98–107)
CO2 SERPL-SCNC: 28 MMOL/L (ref 21–32)
CREAT SERPL-MCNC: 0.8 MG/DL (ref 0.8–1.5)
ERYTHROCYTE [DISTWIDTH] IN BLOOD BY AUTOMATED COUNT: 12.9 % (ref 11.9–14.6)
GLUCOSE SERPL-MCNC: 96 MG/DL (ref 65–100)
HCT VFR BLD AUTO: 40.6 % (ref 41.1–50.3)
HGB BLD-MCNC: 13.4 G/DL (ref 13.6–17.2)
MAGNESIUM SERPL-MCNC: 1.9 MG/DL (ref 1.8–2.4)
MCH RBC QN AUTO: 31.8 PG (ref 26.1–32.9)
MCHC RBC AUTO-ENTMCNC: 33 G/DL (ref 31.4–35)
MCV RBC AUTO: 96.4 FL (ref 79.6–97.8)
NRBC # BLD: 0 K/UL (ref 0–0.2)
PLATELET # BLD AUTO: 202 K/UL (ref 150–450)
PMV BLD AUTO: 10.6 FL (ref 9.4–12.3)
POTASSIUM SERPL-SCNC: 3.7 MMOL/L (ref 3.5–5.1)
RBC # BLD AUTO: 4.21 M/UL (ref 4.23–5.6)
SODIUM SERPL-SCNC: 142 MMOL/L (ref 136–145)
WBC # BLD AUTO: 7.8 K/UL (ref 4.3–11.1)

## 2019-11-24 PROCEDURE — 83735 ASSAY OF MAGNESIUM: CPT

## 2019-11-24 PROCEDURE — 85027 COMPLETE CBC AUTOMATED: CPT

## 2019-11-24 PROCEDURE — 80048 BASIC METABOLIC PNL TOTAL CA: CPT

## 2019-11-24 PROCEDURE — 36415 COLL VENOUS BLD VENIPUNCTURE: CPT

## 2019-11-24 PROCEDURE — 74011250637 HC RX REV CODE- 250/637: Performed by: INTERNAL MEDICINE

## 2019-11-24 RX ORDER — METOPROLOL TARTRATE 25 MG/1
25 TABLET, FILM COATED ORAL 2 TIMES DAILY
Qty: 180 TAB | Refills: 3 | Status: SHIPPED | OUTPATIENT
Start: 2019-11-24

## 2019-11-24 RX ORDER — CLOPIDOGREL BISULFATE 75 MG/1
75 TABLET ORAL DAILY
Qty: 90 TAB | Refills: 3 | Status: SHIPPED | OUTPATIENT
Start: 2019-11-24

## 2019-11-24 RX ORDER — GUAIFENESIN 100 MG/5ML
81 LIQUID (ML) ORAL DAILY
Status: SHIPPED | COMMUNITY
Start: 2019-11-24

## 2019-11-24 RX ORDER — ATORVASTATIN CALCIUM 80 MG/1
80 TABLET, FILM COATED ORAL DAILY
Qty: 90 TAB | Refills: 3 | Status: SHIPPED | OUTPATIENT
Start: 2019-11-24

## 2019-11-24 RX ORDER — NITROGLYCERIN 0.4 MG/1
0.4 TABLET SUBLINGUAL
Qty: 1 BOTTLE | Refills: 5 | Status: SHIPPED | OUTPATIENT
Start: 2019-11-24

## 2019-11-24 RX ADMIN — Medication 5 ML: at 05:46

## 2019-11-24 RX ADMIN — ASPIRIN 81 MG 81 MG: 81 TABLET ORAL at 08:51

## 2019-11-24 RX ADMIN — FAMOTIDINE 20 MG: 20 TABLET, FILM COATED ORAL at 08:50

## 2019-11-24 RX ADMIN — LOSARTAN POTASSIUM 25 MG: 25 TABLET ORAL at 08:51

## 2019-11-24 RX ADMIN — CLOPIDOGREL BISULFATE 75 MG: 75 TABLET ORAL at 08:51

## 2019-11-24 RX ADMIN — ACETAMINOPHEN 650 MG: 325 TABLET, FILM COATED ORAL at 07:16

## 2019-11-24 RX ADMIN — METOPROLOL TARTRATE 25 MG: 25 TABLET ORAL at 08:51

## 2019-11-24 NOTE — PROGRESS NOTES
Verbal bedside report given to Marce Humphrey oncoming RN. Patient's situation, background, assessment and recommendations provided. Opportunity for questions provided. Oncoming RN assumed care of patient. Right radial and right groin sites visualized.

## 2019-11-24 NOTE — PROGRESS NOTES
Pt is visiting area and will be returning to his home and pursue medical follow up there. No supportive care needs at this time. Care Management Interventions  PCP Verified by CM: (Medical follow up in [place])  Mode of Transport at Discharge: (family)  Transition of Care Consult (CM Consult): Discharge Planning  Discharge Durable Medical Equipment: No  Physical Therapy Consult: No  Occupational Therapy Consult: No  Speech Therapy Consult: No  Current Support Network: Own Home(Pt is visiting this area from [place].   Normally manages ADL's.)  Confirm Follow Up Transport: [facility] Information Provided?: No  Discharge Location  Discharge Placement: Home

## 2019-11-24 NOTE — DISCHARGE SUMMARY
[facility] Cardiology Discharge Summary     Patient ID:  Tomasz Stanford  [id number]  71 y.o.  1950    Admit date: 11/22/2019    Discharge date:  11/24/2019    Admitting Physician: Toshia Barlow MD     Discharge Physician: Dr. Sharma Overall    Admission Diagnoses: STEMI (ST elevation myocardial infarction) [facility]) [I21.3]    Discharge Diagnoses:    Diagnosis    CAD (coronary artery disease)    STEMI (ST elevation myocardial infarction)     HTN (hypertension)       Cardiology Procedures this admission:  Left heart catheterization with PCI  EchoCardiogram  Consults: None    Hospital Course: Patient presented to the ED with c/o CP and was found to have ST elevation on EKG. He was evaluated by Dr. Zachary Gutierrez and was subsequently taken for an emergent LHC at [facility] on 11/22/19. Patient underwent cardiac catheterization by Dr. Zachary Gutierrez. Patient was found to have a 95-99% subtotal occlusion of the distal-most aspect of the PDA that was dottered by wire but the vessel failed to re-open. Due to the distal-most location, this was left to medical therapy. Of note, RRA access was obtained x4 but unable to advance a wire up the radial artery. RFA access was obtained and the iliac was extremely tortuous. A 6fr sheath as well as a long Terumo sheath were ineffective at engaging the coronaries. Thus a 65cm Destination sheath was placed and successful angiography was obtained. His RFA site was closed with an Angioseal. Patient tolerated the procedure well and was taken to the CCU floor for recovery. The following morning patient was up feeling well without any complaints of chest pain or shortness of breath. Patient's right radial and right femoral cath sites were clean, dry and intact without hematoma or bruit. Patient's labs were stable. He was transferred to the telemetry floor for further management. On the AM of discharge, Patient was seen and examined by Dr. Zachary Gutierrez and remained with out c/o CP or SOB.  Cath sites remained stable. He was evaluated and determined stable and ready for discharge. Patient was instructed on the importance of medication compliance including taking Aspirin and Plavix everyday without missing a dose. For maximized medical therapy for CAD, patient will continue BB, ARB and statin as well. The patient will follow up with cardiology in [facility] and has been referred to cardiac rehab. DISPOSITION: The patient is being discharged home in stable condition on a low saturated fat, low cholesterol and low salt diet. The patient is instructed to advance activities as tolerated to the limit of fatigue or shortness of breath. The patient is instructed to avoid all heavy lifting, straining, stooping or squatting for 3-5 days. The patient is instructed to watch the cath site for bleeding/oozing; if seen, the patient is instructed to apply firm pressure with a clean cloth and call [facility]iology at [phone]. The patient is instructed to watch for signs of infection which include: increasing area of redness, fever/hot to touch or purulent drainage at the catheterization site. The patient is instructed not to soak in a bathtub for 7-10 days, but is cleared to shower. The patient is instructed to call the office or return to the ER for immediate evaluation for any shortness of breath or chest pain not relieved by NTG. Discharge Exam:   Visit Vitals  /64 (BP 1 Location: Left arm, BP Patient Position: At rest)   Pulse 65   Temp 98.8 °F (37.1 °C)   Resp 18   Ht 6' 3\" (1.905 m)   Wt 87 kg (191 lb 11.2 oz)   SpO2 95%   BMI 23.96 kg/m²       Patient has been seen by Dr. Scottie Grace: see his progress note for exam details.     Recent Results (from the past 24 hour(s))   CBC W/O DIFF    Collection Time: 11/23/19  3:06 AM   Result Value Ref Range    WBC 7.4 4.3 - 11.1 K/uL    RBC 4.03 (L) 4.23 - 5.6 M/uL    HGB 12.9 (L) 13.6 - 17.2 g/dL    HCT 39.0 (L) 41.1 - 50.3 %    MCV 96.8 79.6 - 97.8 FL    MCH 32.0 26.1 - 32.9 PG    MCHC 33.1 31.4 - 35.0 g/dL    RDW 12.9 11.9 - 14.6 %    PLATELET 660 166 - 266 K/uL    MPV 10.4 9.4 - 12.3 FL    ABSOLUTE NRBC 0.00 0.0 - 0.2 K/uL   MAGNESIUM    Collection Time: 11/23/19  3:06 AM   Result Value Ref Range    Magnesium 2.0 1.8 - 2.4 mg/dL   METABOLIC PANEL, BASIC    Collection Time: 11/23/19  3:06 AM   Result Value Ref Range    Sodium 140 136 - 145 mmol/L    Potassium 3.7 3.5 - 5.1 mmol/L    Chloride 108 (H) 98 - 107 mmol/L    CO2 26 21 - 32 mmol/L    Anion gap 6 (L) 7 - 16 mmol/L    Glucose 99 65 - 100 mg/dL    BUN 20 8 - 23 MG/DL    Creatinine 0.68 (L) 0.8 - 1.5 MG/DL    GFR est AA >60 >60 ml/min/1.73m2    GFR est non-AA >60 >60 ml/min/1.73m2    Calcium 7.9 (L) 8.3 - 10.4 MG/DL   LIPID PANEL    Collection Time: 11/23/19  3:06 AM   Result Value Ref Range    LIPID PROFILE          Cholesterol, total 141 <200 MG/DL    Triglyceride 70 35 - 150 MG/DL    HDL Cholesterol 58 40 - 60 MG/DL    LDL, calculated 69 <100 MG/DL    VLDL, calculated 14 6.0 - 23.0 MG/DL    CHOL/HDL Ratio 2.4     TSH 3RD GENERATION    Collection Time: 11/23/19  3:06 AM   Result Value Ref Range    TSH 1.790 0.358 - 3.740 uIU/mL   HEMOGLOBIN A1C WITH EAG    Collection Time: 11/23/19  3:06 AM   Result Value Ref Range    Hemoglobin A1c 5.4 4.8 - 6.0 %    Est. average glucose 108 mg/dL   TROPONIN I    Collection Time: 11/23/19  3:06 AM   Result Value Ref Range    Troponin-I, Qt. 8.65 (HH) 0.02 - 0.05 NG/ML         Patient Instructions:     Current Discharge Medication List      START taking these medications    Details   atorvastatin (LIPITOR) 80 mg tablet Take 1 Tab by mouth daily. Qty: 90 Tab, Refills: 3      aspirin 81 mg chewable tablet Take 1 Tab by mouth daily. clopidogrel (PLAVIX) 75 mg tab Take 1 Tab by mouth daily. Qty: 90 Tab, Refills: 3      nitroglycerin (NITROSTAT) 0.4 mg SL tablet 1 Tab by SubLINGual route every five (5) minutes as needed for Chest Pain. Up to 3 doses.   Qty: 1 Bottle, Refills: 5 metoprolol tartrate (LOPRESSOR) 25 mg tablet Take 1 Tab by mouth two (2) times a day. Qty: 180 Tab, Refills: 3         CONTINUE these medications which have NOT CHANGED    Details   losartan (COZAAR) 25 mg tablet Take 25 mg by mouth daily. ATTENDING ADDENDUM:    Patient seen and examined by me. Agree with above note by physician extender. Key findings are:  No CP or HERNÁNDEZ, labs and exam stable. Ready to go home. No arrhythmias overnight. CV- RRR without murmur  Lungs- Clear bilaterally  Abd- soft, nontender, nondistended  Ext- no edema, right groin CDI    Plan: As above. Home on ASA, statin, plavix, and BB therapy. He needs to establish cardiac care in [place] upon return to home.      Berwyn Bloch, MD  [facility] Cardiology  Pager [phone]

## 2019-11-24 NOTE — PROGRESS NOTES
Verbal bedside report received from [facility]. Assumed care of patient.

## 2019-11-24 NOTE — DISCHARGE INSTRUCTIONS
The patient is being discharged home in stable condition on a low saturated fat, low cholesterol and low salt diet. The patient is instructed to advance activities as tolerated to the limit of fatigue or shortness of breath. The patient is instructed to avoid all heavy lifting, straining, stooping or squatting for 3-5 days. The patient is instructed to watch the cath site for bleeding/oozing; if seen, the patient is instructed to apply firm pressure with a clean cloth and call [facility] at [phone]. The patient is instructed to watch for signs of infection which include: increasing area of redness, fever/hot to touch or purulent drainage at the catheterization site. The patient is instructed not to soak in a bathtub for 7-10 days, but is cleared to shower. The patient is instructed to call the office or return to the ER for immediate evaluation for any shortness of breath or chest pain not relieved by NTG. Patient Education        Percutaneous Coronary Intervention: What to Expect at Home  Your Recovery    Percutaneous coronary intervention (PCI) is the name for procedures that are used to open a narrowed or blocked coronary artery. The two most common PCI procedures are coronary angioplasty and coronary stent placement. Your groin or arm may have a bruise and feel sore for a day or two after a percutaneous coronary intervention (PCI). You can do light activities around the house, but nothing strenuous for several days. This care sheet gives you a general idea about how long it will take for you to recover. But each person recovers at a different pace. Follow the steps below to get better as quickly as possible. How can you care for yourself at home? Activity    · If the doctor gave you a sedative:  ? For 24 hours, don't do anything that requires attention to detail, such as going to work, making important decisions, or signing any legal documents.  It takes time for the medicine's effects to completely wear off.  ? For your safety, do not drive or operate any machinery that could be dangerous. Wait until the medicine wears off and you can think clearly and react easily.     · Do not do strenuous exercise and do not lift, pull, or push anything heavy until your doctor says it is okay. This may be for a day or two. You can walk around the house and do light activity, such as cooking.     · If the catheter was placed in your groin, try not to walk up stairs for the first couple of days.     · If the catheter was placed in your arm near your wrist, do not bend your wrist deeply for the first couple of days. Be careful using your hand to get into and out of a chair or bed.     · Carry your stent identification card with you at all times.     · If your doctor recommends it, get more exercise. Walking is a good choice. Bit by bit, increase the amount you walk every day. Try for at least 30 minutes on most days of the week. Diet    · Drink plenty of fluids to help your body flush out the dye. If you have kidney, heart, or liver disease and have to limit fluids, talk with your doctor before you increase the amount of fluids you drink.     · Keep eating a heart-healthy diet that has lots of fruits, vegetables, and whole grains. If you have not been eating this way, talk to your doctor. You also may want to talk to a dietitian. This expert can help you to learn about healthy foods and plan meals. Medicines    · Your doctor will tell you if and when you can restart your medicines. He or she will also give you instructions about taking any new medicines.     · If you take blood thinners, such as warfarin (Coumadin), clopidogrel (Plavix), or aspirin, be sure to talk to your doctor. He or she will tell you if and when to start taking those medicines again. Make sure that you understand exactly what your doctor wants you to do.     · Your doctor will prescribe blood-thinning medicines.  You will likely take aspirin plus another antiplatelet, such as clopidogrel (Plavix). It is very important that you take these medicines exactly as directed. These medicines help keep the coronary artery open and reduce your risk of a heart attack.     · Call your doctor if you think you are having a problem with your medicine.    Care of the catheter site    · For 1 or 2 days, keep a bandage over the spot where the catheter was inserted. The bandage probably will fall off in this time.     · Put ice or a cold pack on the area for 10 to 20 minutes at a time to help with soreness or swelling. Put a thin cloth between the ice and your skin.     · You may shower 24 to 48 hours after the procedure, if your doctor okays it. Pat the incision dry.     · Do not soak the catheter site until it is healed. Don't take a bath for 1 week, or until your doctor tells you it is okay.     · Watch for bleeding from the site. A small amount of blood (up to the size of a quarter) on the bandage can be normal.     · If you are bleeding, lie down and press on the area for 15 minutes to try to make it stop. If the bleeding does not stop, call your doctor or seek immediate medical care. Follow-up care is a key part of your treatment and safety. Be sure to make and go to all appointments, and call your doctor if you are having problems. It's also a good idea to know your test results and keep a list of the medicines you take. When should you call for help? Call 911 anytime you think you may need emergency care. For example, call if:    · You passed out (lost consciousness).     · You have severe trouble breathing.     · You have sudden chest pain and shortness of breath, or you cough up blood.     · You have symptoms of a heart attack, such as:  ? Chest pain or pressure. ? Sweating. ? Shortness of breath. ? Nausea or vomiting. ? Pain that spreads from the chest to the neck, jaw, or one or both shoulders or arms. ? Dizziness or lightheadedness.   ? A fast or uneven pulse. After calling 911, chew 1 adult-strength aspirin. Wait for an ambulance. Do not try to drive yourself.     · You have been diagnosed with angina, and you have angina symptoms that do not go away with rest or are not getting better within 5 minutes after you take one dose of nitroglycerin.    Call your doctor now or seek immediate medical care if:    · You are bleeding from the area where the catheter was put in your artery.     · You have a fast-growing, painful lump at the catheter site.     · You have signs of infection, such as:  ? Increased pain, swelling, warmth, or redness. ? Red streaks leading from the catheter site. ? Pus draining from the catheter site. ? A fever.     · Your leg, arm, or hand is painful, looks blue, or feels cold, numb, or tingly.    Watch closely for changes in your health, and be sure to contact your doctor if you have any problems. Where can you learn more? Go to http://martita-cruzito.info/. Enter U932 in the search box to learn more about \"Percutaneous Coronary Intervention: What to Expect at Home. \"  Current as of: April 9, 2019  Content Version: 12.2  © 8503-1700 [employer]. Care instructions adapted under license by [employer] (which disclaims liability or warranty for this information). If you have questions about a medical condition or this instruction, always ask your healthcare professional. [place] [address] any warranty or liability for your use of this information. Patient Education        Chest Pain: Care Instructions  Your Care Instructions    There are many things that can cause chest pain. Some are not serious and will get better on their own in a few days. But some kinds of chest pain need more testing and treatment. Your doctor may have recommended a follow-up visit in the next 8 to 12 hours. If you are not getting better, you may need more tests or treatment.   Even though your doctor has released you, you still need to watch for any problems. The doctor carefully checked you, but sometimes problems can develop later. If you have new symptoms or if your symptoms do not get better, get medical care right away. If you have worse or different chest pain or pressure that lasts more than 5 minutes or you passed out (lost consciousness), call 911 or seek other emergency help right away. A medical visit is only one step in your treatment. Even if you feel better, you still need to do what your doctor recommends, such as going to all suggested follow-up appointments and taking medicines exactly as directed. This will help you recover and help prevent future problems. How can you care for yourself at home? · Rest until you feel better. · Take your medicine exactly as prescribed. Call your doctor if you think you are having a problem with your medicine. · Do not drive after taking a prescription pain medicine. When should you call for help? Call 911 if:    · You passed out (lost consciousness).     · You have severe difficulty breathing.     · You have symptoms of a heart attack. These may include:  ? Chest pain or pressure, or a strange feeling in your chest.  ? Sweating. ? Shortness of breath. ? Nausea or vomiting. ? Pain, pressure, or a strange feeling in your back, neck, jaw, or upper belly or in one or both shoulders or arms. ? Lightheadedness or sudden weakness. ? A fast or irregular heartbeat. After you call 911, the  may tell you to chew 1 adult-strength or 2 to 4 low-dose aspirin. Wait for an ambulance. Do not try to drive yourself.    Call your doctor today if:    · You have any trouble breathing.     · Your chest pain gets worse.     · You are dizzy or lightheaded, or you feel like you may faint.     · You are not getting better as expected.     · You are having new or different chest pain. Where can you learn more?   Go to http://martita-cruzito.info/. Enter A120 in the search box to learn more about \"Chest Pain: Care Instructions. \"  Current as of: June 26, 2019  Content Version: 12.2  © 8080-2975 [employer]. Care instructions adapted under license by [employer] (which disclaims liability or warranty for this information). If you have questions about a medical condition or this instruction, always ask your healthcare professional. [address] any warranty or liability for your use of this information. Patient Education   Clopidogrel (By mouth)   Clopidogrel (vboa-VEW-am-grel)  Helps prevent stroke, heart attack, and other heart problems. This medicine is a platelet inhibitor. Brand Name(s): Plavix   There may be other brand names for this medicine. When This Medicine Should Not Be Used: This medicine is not right for everyone. Do not use it if you had an allergic reaction to clopidogrel. How to Use This Medicine:   Tablet  · Your doctor will tell you how much medicine to use. Do not use more than directed. · This medicine should come with a Medication Guide. Ask your pharmacist for a copy if you do not have one. · Missed dose: Take a dose as soon as you remember. If it is almost time for your next dose, wait until then and take a regular dose. Do not take extra medicine to make up for a missed dose. · Store the medicine in a closed container at room temperature, away from heat, moisture, and direct light. Drugs and Foods to Avoid:   Ask your doctor or pharmacist before using any other medicine, including over-the-counter medicines, vitamins, and herbal products. · Some medicines can affect how clopidogrel works.  Tell your doctor if you are using any of the following:   ¨ Esomeprazole, omeprazole, repaglinide, or warfarin  ¨ Medicine to treat depression  ¨ NSAID pain or arthritis medicine (including celecoxib, diclofenac, ibuprofen, or naproxen)  Warnings While Using This Medicine:   · Tell your doctor if you are pregnant or breastfeeding, or if you have bleeding problems, stomach ulcer or bleeding, a recent stroke, or have a history of bleeding problems. · This medicine can cause you to bleed and bruise more easily. Take precautions to avoid injury. Brush and floss your teeth gently, do not play rough sports, and be careful with sharp objects. Severe bleeding can be life-threatening. · This medicine may cause a rare but serious blood clotting condition called thrombotic thrombocytopenic purpura. · Make sure any doctor or dentist who treats you knows that you are using this medicine. Tell your doctor if you plan to have surgery or a dental procedure. · Do not stop using this medicine suddenly. Your doctor will need to slowly decrease your dose before you stop it completely. · Your doctor will check your progress and the effects of this medicine at regular visits. Keep all appointments. · Keep all medicine out of the reach of children. Never share your medicine with anyone. Possible Side Effects While Using This Medicine:   Call your doctor right away if you notice any of these side effects:  · Allergic reaction: Itching or hives, swelling in your face or hands, swelling or tingling in your mouth or throat, chest tightness, trouble breathing  · Bloody or black, tarry stools  · Nosebleeds  · Pinpoint red or purple spots on your skin or in your mouth  · Problems with vision, speech, or walking  · Red or dark brown urine  · Seizures  · Severe stomach pain  · Trouble breathing, tiredness, fast heartbeat, yellow skin or eyes  · Unusual bleeding, bruising, or weakness  · Vomiting of blood or vomit that looks like coffee grounds  If you notice other side effects that you think are caused by this medicine, tell your doctor. Call your doctor for medical advice about side effects.  You may report side effects to FDA at 4-503-BWU-1717  © 2017 [facility] [address] is for End User's use only and may not be sold, redistributed or otherwise used for commercial purposes. The above information is an  only. It is not intended as medical advice for individual conditions or treatments. Talk to your doctor, nurse or pharmacist before following any medical regimen to see if it is safe and effective for you. Patient Education   Metoprolol (By mouth)   Metoprolol (met-oh-PROE-lol)  Treats high blood pressure, angina (chest pain), and heart failure. May lower the risk of death after a heart attack. This medicine is a beta-blocker. Brand Name(s): Lopressor, Toprol XL   There may be other brand names for this medicine. When This Medicine Should Not Be Used: This medicine is not right for everyone. Do not use if you had an allergic reaction to metoprolol or similar medicines. Do not use this medicine if you have certain blood circulation or heart problems. Ask your doctor about these problems. How to Use This Medicine:   Tablet, Long Acting Tablet  · Take your medicine as directed. Your dose may need to be changed several times to find what works best for you. · Take this medicine with a meal or right after a meal. Take this medicine the same way every day, at the same time. · Swallow the tablet whole with a glass of water. You may break the extended-release tablet in half, but do not chew or crush it. · Missed dose: Take a dose as soon as you remember. If it is almost time for your next dose, wait until then and take a regular dose. Do not take extra medicine to make up for a missed dose. · Store the medicine in a closed container at room temperature, away from heat, moisture, and direct light. Drugs and Foods to Avoid:   Ask your doctor or pharmacist before using any other medicine, including over-the-counter medicines, vitamins, and herbal products. · Some medicines can affect how metoprolol works.  Tell your doctor if you are taking any of the following:   ¨ Digoxin, dipyridamole, hydralazine, hydroxychloroquine, methyldopa, quinidine  ¨ Medicine to treat depression (such as bupropion, clomipramine, desipramine, fluoxetine, fluvoxamine, paroxetine, sertraline), medicine to treat mental illness (such as chlorpromazine, fluphenazine, haloperidol, thioridazine), medicine for heart rhythm problems (such as propafenone), HIV/AIDS medicine (such as ritonavir), medicine to treat a fungus infection (such as terbinafine), a monoamine oxidase inhibitor (MAOI), an ergot medicine for headaches, a calcium channel blocker (such as amlodipine, diltiazem, verapamil), or an alpha blocker (such as clonidine, prazosin, reserpine, guanethidine)  Warnings While Using This Medicine:   · Tell your doctor if you are pregnant or breastfeeding, or if you have blood vessel, heart, or circulation problems (such as heart failure, rhythm problems, or slow heartbeat). Tell your doctor if you have kidney disease, liver disease, diabetes, lung disease (such as asthma), an overactive thyroid, or a history of allergies. · This medicine may cause worse symptoms of heart failure while the dose is being adjusted. · Do not stop using this medicine suddenly. Your doctor will need to slowly decrease your dose before you stop it completely. · Tell any doctor or dentist who treats you that you are using this medicine. You may need to stop using this medicine several days before you have surgery or medical tests. · This medicine could lower your blood pressure too much, especially when you first use it or if you are dehydrated. Stand or sit up slowly if you feel lightheaded or dizzy. · This medicine may make you dizzy or drowsy. Do not drive or do anything else that could be dangerous until you know how this medicine affects you. · Your doctor will check your progress and the effects of this medicine at regular visits. Keep all appointments.   · Keep all medicine out of the reach of children. Never share your medicine with anyone. Possible Side Effects While Using This Medicine:   Call your doctor right away if you notice any of these side effects:  · Allergic reaction: Itching or hives, swelling in your face or hands, swelling or tingling in your mouth or throat, chest tightness, trouble breathing  · Lightheadedness, dizziness, or fainting  · Slow heartbeat  · Swelling in your hands, ankles, or feet, trouble breathing, tiredness  · Worsening chest pain  If you notice these less serious side effects, talk with your doctor:   · Diarrhea  · Mild dizziness or tiredness  If you notice other side effects that you think are caused by this medicine, tell your doctor. Call your doctor for medical advice about side effects. You may report side effects to FDA at 4-590-FDA-1398  © 2017 [facility] Information is for End User's use only and may not be sold, redistributed or otherwise used for commercial purposes. The above information is an  only. It is not intended as medical advice for individual conditions or treatments. Talk to your doctor, nurse or pharmacist before following any medical regimen to see if it is safe and effective for you. Patient Education   Nitroglycerin, Rapid Release (By mouth)   Nitroglycerin (she-bugi-HTEF-er-in)  Treats or prevents angina (chest pain). This medicine is a nitrate. Brand Name(s): NitroMist, Nitroglycerin Lingual Aerosol, Nitrolingual, Nitrostat   There may be other brand names for this medicine. When This Medicine Should Not Be Used: This medicine is not right for everyone. Do not use it if you had an allergic reaction to nitroglycerin or similar medicines. How to Use This Medicine:   Powder, Spray, Tablet  · Your doctor will tell you how much medicine to use. Do not use more than directed. Sit down before you take this medicine, because it could make you lightheaded.   · You may use this medicine 5 to 10 minutes before an activity that can cause angina. This may help prevent the attack. · Read and follow the patient instructions that come with this medicine. Talk to your doctor or pharmacist if you have any questions. · Powder:   ¨ Empty the contents of a packet under your tongue. Close your mouth and breathe normally. Allow powder to dissolve without swallowing. Do not rinse or spit for 5 minutes after taking this medicine. Do not take more than 3 packets in 15 minutes. If you still have pain after you take a total of 3 packets, this is an emergency. Call 911. Do not drive yourself to the hospital.  ¨ Store the powder at room temperature, away from heat, moisture, and direct light. · Tablets:   ¨ Wet the tablet with saliva and place it under your tongue or inside your cheek. Let the tablet dissolve. Do not chew, crush, or swallow the tablet. Wait 5 minutes. If you still have pain, take a second tablet. Do not take more than 3 tablets in 15 minutes. If you still have pain after you take a total of 3 tablets, this is an emergency. Call 911. Do not drive yourself to the hospital.  ¨ Store the tablets at room temperature in the original container, away from heat, moisture, and direct light. · Spray:   ¨ To prime the spray before you use it for the first time:   § Point the Nitrolingual® Pumpspray bottle away from your face. Pump the spray 5 times. The medicine is now ready to use. § If you do not use the medicine for 6 weeks, pump the spray 1 time to re-prime the bottle. § If you do not use the medicine for 3 months, pump the spray 5 times to re-prime the bottle. § Point the [employer] away from your face. Pump the spray 10 times. The medicine is now ready to use. § If you do not use the medicine for 6 weeks, pump the spray 2 times to re-prime the bottle. ¨ To use the spray:   § Hold the bottle upright and close to your mouth. Open your mouth and spray the medicine onto or under your tongue.  Close your mouth right away. Do not inhale the spray or get it in your eyes. Do not rinse your mouth for at least 5 to 10 minutes. § Wait 5 minutes. If you still have pain, use another spray. Do not use more than 3 sprays within 15 minutes. § If you still have pain after you use a total of 3 sprays, this is an emergency. Call 911. Do not drive yourself to the hospital.  ¨ Do not shake the bottle. Do not use the spray near heat, open flame, or while smoking. ¨ Check the fluid level regularly according to the medicine directions. Refill your prescription before the level falls too low. ¨ Store the spray in an upright position at room temperature, away from heat. Do not open or burn the spray container. Drugs and Foods to Avoid:   Ask your doctor or pharmacist before using any other medicine, including over-the-counter medicines, vitamins, and herbal products. · Do not use this medicine if you are also using avanafil, riociguat, sildenafil, tadalafil, or vardenafil. · Some foods and medicines can affect how nitroglycerin works. Tell your doctor if you are using any of the following:  ¨ Alteplase, aspirin, heparin  ¨ Blood pressure medicines  ¨ Diuretic (water pill)  ¨ Ergot medicines  · Tell your doctor if you are using any medicine that makes your mouth dry, such as medicines that treat depression. Warnings While Using This Medicine:   · Tell your doctor if you are pregnant or breastfeeding, or if you have kidney disease, anemia, low blood pressure, heart failure, heart disease, an enlarged heart, or a recent heart attack or stroke. Tell your doctor if you have a history of a head injury. · This medicine may cause severe low blood pressure. This can make you dizzy or lightheaded. Do not drive or do anything else that could be dangerous until you know how this medicine affects you. These symptoms may be worse if you drink alcohol.   · Medicine that treats chest pain sometimes causes headaches when you first start using it. This is normal. Do not stop using the medicine to avoid headaches. Ask your doctor if you can take aspirin or acetaminophen to treat the headache. · Keep all medicine out of the reach of children. Never share your medicine with anyone. Possible Side Effects While Using This Medicine:   Call your doctor right away if you notice any of these side effects:  · Allergic reaction: Itching or hives, swelling in your face or hands, swelling or tingling in your mouth or throat, chest tightness, trouble breathing  · Blurred vision  · Increased chest pain, fast or slow heartbeat  · Severe or ongoing dizziness, lightheadedness, or fainting  · Throbbing, severe, or ongoing headache, confusion, low fever, vision problems  · Trouble breathing, cold sweat, blue skin, lips, or nails  · Warmth or redness in your face, neck, arms, or upper chest  If you notice these less serious side effects, talk with your doctor:   · Dry mouth  · Nausea or vomiting  · Numbness or tingling in your hands, ankles, or feet  If you notice other side effects that you think are caused by this medicine, tell your doctor. Call your doctor for medical advice about side effects. You may report side effects to FDA at 6-091-FDA-0857  © 2017 [facility] Information is for End User's use only and may not be sold, redistributed or otherwise used for commercial purposes. The above information is an  only. It is not intended as medical advice for individual conditions or treatments. Talk to your doctor, nurse or pharmacist before following any medical regimen to see if it is safe and effective for you. Patient Education        Reducing Heart Attack Risk With Daily Medicine: Care Instructions  Your Care Instructions    If you are at risk for a heart attack, there are many medicines that can reduce your risk. These include:  · ACE inhibitors or ARBs. These are types of blood pressure medicines.  They can reduce the risk of heart attacks and strokes if you are at high risk. · Statins and other cholesterol medicines. These lower cholesterol. They can also reduce the risk of a stroke. · Aspirin and other antiplatelets. If you are at high risk of a heart attack or stroke and at low risk of bleeding problems, your doctor might talk to you about taking an aspirin every day to lower your risk. Only take daily aspirin if your doctor recommends it because it's not right for everybody. · Beta-blocker medicines. These are a type of blood pressure and heart medicine. They can reduce the chance of early death if you have had a heart attack. All medicines can cause side effects. So it is important to understand the pros and cons of any medicine you take. It is also important to take your medicines exactly as your doctor tells you to. Follow-up care is a key part of your treatment and safety. Be sure to make and go to all appointments, and call your doctor if you are having problems. It's also a good idea to know your test results and keep a list of the medicines you take. ACE inhibitors  ACE (angiotensin-converting enzyme) inhibitors are used for three main reasons. They lower blood pressure, protect the kidneys, and prevent heart attacks and strokes. Examples include benazepril (Lotensin), lisinopril (Prinivil, Zestril), and ramipril (Altace). An angiotensin II receptor blocker (ARB) may be used instead of an ACE inhibitor. ARBs help you in the same ways as ACE inhibitors. Examples include candesartan (Atacand), irbesartan (Avapro), and losartan (Cozaar). Before you start taking an ACE inhibitor or an ARB, make sure your doctor knows if:  · You are taking a water pill (diuretic). · You are taking potassium pills or using salt substitutes. · You are pregnant or breastfeeding. · You have had a kidney transplant or other kidney problems. ACE inhibitors and ARBs can cause side effects.  Call your doctor right away if you have:  · Trouble breathing. · Swelling in your face, head, neck, or tongue. · Dizziness or lightheadedness. · A dry cough. Statins  Statins lower cholesterol. Examples include atorvastatin (Lipitor), lovastatin (Mevacor), pravastatin (Pravachol), and simvastatin (Zocor). Before you start taking a statin, make sure your doctor knows if:  · You have had a kidney transplant or other kidney problems. · You have liver disease. · You take any other prescription medicine, over-the-counter medicine, vitamins, supplements, or herbal remedies. · You are pregnant or breastfeeding. Statins can cause side effects. Call your doctor right away if you have:  · New, severe muscle aches. · Brown urine. Aspirin  If you are at high risk of a heart attack, your doctor might talk to you about taking an aspirin every day to lower your risk. A heart attack occurs when a blood vessel in the heart gets blocked. When this happens, oxygen can't get to the heart muscle, and part of the heart dies. Aspirin can help prevent blood clots that can block the blood vessels. But talk to your doctor before you start taking aspirin every day. Daily aspirin isn't right for most people. This is because it can cause serious bleeding. You and your doctor can decide if aspirin is a good choice for you based on your risk of a heart attack and your risk of serious bleeding. You may not be able to use aspirin if you:  · Have asthma or certain other health conditions. · Have an ulcer or other stomach problem. · Take some other medicine (called a blood thinner) that prevents blood clots. · Are allergic to aspirin. Your doctor may recommend that you take one low-dose aspirin (81 mg) tablet each day, with a meal and a full glass of water. Before having a surgery or procedure, tell your doctor or dentist that you take aspirin. He or she will tell you if you should stop taking aspirin beforehand.  Make sure that you understand exactly what your doctor wants you to do.  Aspirin can cause side effects. Call your doctor right away if you have:  · Unusual bleeding or bruising. · Nausea, vomiting, or heartburn. · Black or bloody stools. Beta-blockers  Beta-blockers are used for three main reasons. They lower blood pressure, relieve angina symptoms (such as chest pain or pressure), and reduce the chances of a second heart attack. They include atenolol (Tenormin), carvedilol (Coreg), and metoprolol (Lopressor). Before you start taking a beta-blocker, make sure your doctor knows if you have:  · Severe asthma or frequent asthma attacks. · A very slow pulse (less than 55 beats a minute). Beta-blockers can cause side effects. Call your doctor right away if you have:  · Wheezing or trouble breathing. · Dizziness or lightheadedness. · Asthma that gets worse. When should you call for help? Watch closely for changes in your health, and be sure to contact your doctor if you have any problems. Where can you learn more? Go to http://martita-cruzito.info/. Enter R428 in the search box to learn more about \"Reducing Heart Attack Risk With Daily Medicine: Care Instructions. \"  Current as of: April 9, 2019  Content Version: 12.2  © 1462-5347 [employer]. Care instructions adapted under license by [employer] (which disclaims liability or warranty for this information). If you have questions about a medical condition or this instruction, always ask your healthcare professional. [place] [address] any warranty or liability for your use of this information.          DISCHARGE SUMMARY from Nurse    PATIENT INSTRUCTIONS:    After general anesthesia or intravenous sedation, for 24 hours or while taking prescription Narcotics:  · Limit your activities  · Do not drive and operate hazardous machinery  · Do not make important personal or business decisions  · Do  not drink alcoholic beverages  · If you have not urinated within 8 hours after discharge, please contact your surgeon on call. Report the following to your surgeon:  · Excessive pain, swelling, redness or odor of or around the surgical area  · Temperature over 100.5  · Nausea and vomiting lasting longer than 4 hours or if unable to take medications  · Any signs of decreased circulation or nerve impairment to extremity: change in color, persistent  numbness, tingling, coldness or increase pain  · Any questions    What to do at Home:  Recommended activity: Activity as tolerated, limited by restrictions stated by physician    If you experience any of the following symptoms Shortness of breath or chest pain, please follow up with your cardiologist or primary care physician. *  Please give a list of your current medications to your Primary Care Provider. *  Please update this list whenever your medications are discontinued, doses are      changed, or new medications (including over-the-counter products) are added. *  Please carry medication information at all times in case of emergency situations. These are general instructions for a healthy lifestyle:    No smoking/ No tobacco products/ Avoid exposure to second hand smoke  Surgeon General's Warning:  Quitting smoking now greatly reduces serious risk to your health. Obesity, smoking, and sedentary lifestyle greatly increases your risk for illness    A healthy diet, regular physical exercise & weight monitoring are important for maintaining a healthy lifestyle    You may be retaining fluid if you have a history of heart failure or if you experience any of the following symptoms:  Weight gain of 3 pounds or more overnight or 5 pounds in a week, increased swelling in our hands or feet or shortness of breath while lying flat in bed. Please call your doctor as soon as you notice any of these symptoms; do not wait until your next office visit. The discharge information has been reviewed with the patient.   The patient verbalized understanding. Discharge medications reviewed with the patient and appropriate educational materials and side effects teaching were provided.   ___________________________________________________________________________________________________________________________________

## 2019-11-24 NOTE — PROGRESS NOTES
Discharge instructions reviewed with patient. Prescriptions given for aspirin, metoprolol, nitrostat, lipitor and med info sheets provided for all new medications. Opportunity for questions provided. Patient voiced understanding of all discharge instructions.

## 2019-11-24 NOTE — PROGRESS NOTES
Bedside and Verbal shift change report given to [facility], [facility], RN (oncoming nurse) by self, RN (offgoing nurse). Report included the following information SBAR, Kardex, Intake/Output, MAR and Recent Results. Right groin cath site visualized with oncoming RN. Dressing CDI.

## 2019-11-24 NOTE — PROGRESS NOTES
Date of Outreach Update:  Char Laguna was seen and assessed. Patient sleeping, no distress noted. NSR, rate 64. MEWS Score: 1 (11/24/19 0104)  Vitals:    11/23/19 1423 11/23/19 1703 11/23/19 2046 11/24/19 0104   BP: 124/74 136/73 130/75 101/63   Pulse: 66 82 63 78   Resp: 20 19 18 18   Temp: 98.3 °F (36.8 °C) 97.7 °F (36.5 °C) 98.8 °F (37.1 °C) 98.6 °F (37 °C)   SpO2: 96% 96% 95% 96%   Weight:       Height:             Pain Assessment  Pain Intensity 1: 0 (11/24/19 0030)  Pain Location 1: Back  Pain Intervention(s) 1: Medication (see MAR)  Patient Stated Pain Goal: 0      Previous Outreach assessment has been reviewed. There have been no significant clinical changes since the completion of the last dated Outreach assessment. Will continue to follow up per outreach protocol.     Signed By:   Makenna Weber    November 24, 2019 4:00 AM

## 2019-11-24 NOTE — PROGRESS NOTES
[facility] CRITICAL CARE OUTREACH NURSE PROGRESS REPORT      SUBJECTIVE: Called to assess patient secondary to transfer from critical care. MEWS Score: 1 (11/23/19 1703)  Vitals:    11/23/19 1301 11/23/19 1330 11/23/19 1423 11/23/19 1703   BP: 141/75 137/72 124/74 136/73   Pulse: 64 67 66 82   Resp:  24 20 19   Temp:   98.3 °F (36.8 °C) 97.7 °F (36.5 °C)   SpO2: 98% 98% 96% 96%   Weight:       Height:          EKG: normal EKG, normal sinus rhythm, unchanged from previous tracings. LAB DATA:    Recent Labs     11/23/19 0306 11/22/19 2021    143   K 3.7 3.9   * 110*   CO2 26 23   AGAP 6* 10   GLU 99 102*   BUN 20 27*   CREA 0.68* 0.90   GFRAA >60 >60   GFRNA >60 >60   CA 7.9* 8.8   MG 2.0  --    ALB  --  3.5   TP  --  7.8   GLOB  --  4.3*   AGRAT  --  0.8*   ALT  --  36        Recent Labs     11/23/19 0306 11/22/19 2021   WBC 7.4 9.3   HGB 12.9* 14.6   HCT 39.0* 43.7    233          OBJECTIVE: On arrival to room, I found patient to be resting in bed, visitor at bedside. Pain Assessment  Pain Intensity 1: 3 (11/23/19 1946)  Pain Location 1: Back  Pain Intervention(s) 1: Medication (see MAR)  Patient Stated Pain Goal: 0                                 ASSESSMENT:  Patient is alert and oriented, denies pain or shortness of breath. Only complaint is slight discomfort from bed. HR 64. 02 sat 96% on RA, lungs CTA. Patient in no apparent distress. PLAN:  Continue to follow per outreach protocol.

## 2019-11-24 NOTE — PROGRESS NOTES
Problem: Falls - Risk of  Goal: *Absence of Falls  Description  Document Tonio Murrieta Fall Risk and appropriate interventions in the flowsheet.   Outcome: Progressing Towards Goal  Note: Fall Risk Interventions:            Medication Interventions: Evaluate medications/consider consulting pharmacy, Patient to call before getting OOB, Teach patient to arise slowly    Elimination Interventions: Call light in reach              Problem: Pain  Goal: *Control of Pain  Outcome: Progressing Towards Goal     Problem: Cath Lab Procedures: Discharge Outcomes  Goal: *Stable cardiac rhythm  Outcome: Progressing Towards Goal

## 2019-11-24 NOTE — PROGRESS NOTES
Bedside and Verbal shift change report given to self, RN (oncoming nurse) by Etelvina Ramon RN (offgoing nurse). Report included the following information SBAR, Kardex, Intake/Output, MAR and Recent Results. Right groin site visualized with off going RN. Dressing CDI.